# Patient Record
Sex: MALE | Race: WHITE | NOT HISPANIC OR LATINO | Employment: FULL TIME | URBAN - METROPOLITAN AREA
[De-identification: names, ages, dates, MRNs, and addresses within clinical notes are randomized per-mention and may not be internally consistent; named-entity substitution may affect disease eponyms.]

---

## 2017-09-29 ENCOUNTER — OFFICE VISIT - RIVER FALLS (OUTPATIENT)
Dept: FAMILY MEDICINE | Facility: CLINIC | Age: 42
End: 2017-09-29

## 2017-09-29 ASSESSMENT — MIFFLIN-ST. JEOR: SCORE: 1814.32

## 2017-09-30 LAB
CHOLEST SERPL-MCNC: 266 MG/DL
CHOLEST/HDLC SERPL: 4.4 {RATIO}
CREAT SERPL-MCNC: 1.3 MG/DL (ref 0.6–1.35)
GLUCOSE BLD-MCNC: 92 MG/DL (ref 65–99)
HBA1C MFR BLD: 5 %
HDLC SERPL-MCNC: 61 MG/DL
LDLC SERPL CALC-MCNC: 184 MG/DL
NONHDLC SERPL-MCNC: 205 MG/DL
TRIGL SERPL-MCNC: 94 MG/DL

## 2017-10-10 ENCOUNTER — OFFICE VISIT - RIVER FALLS (OUTPATIENT)
Dept: FAMILY MEDICINE | Facility: CLINIC | Age: 42
End: 2017-10-10

## 2017-10-12 ENCOUNTER — OFFICE VISIT - RIVER FALLS (OUTPATIENT)
Dept: FAMILY MEDICINE | Facility: CLINIC | Age: 42
End: 2017-10-12

## 2017-10-12 ASSESSMENT — MIFFLIN-ST. JEOR: SCORE: 1827.02

## 2018-10-02 ENCOUNTER — OFFICE VISIT - RIVER FALLS (OUTPATIENT)
Dept: FAMILY MEDICINE | Facility: CLINIC | Age: 43
End: 2018-10-02

## 2018-10-02 ASSESSMENT — MIFFLIN-ST. JEOR: SCORE: 1818.86

## 2019-04-30 ENCOUNTER — OFFICE VISIT - RIVER FALLS (OUTPATIENT)
Dept: FAMILY MEDICINE | Facility: CLINIC | Age: 44
End: 2019-04-30

## 2019-04-30 ASSESSMENT — MIFFLIN-ST. JEOR: SCORE: 1783.14

## 2019-05-01 ENCOUNTER — COMMUNICATION - RIVER FALLS (OUTPATIENT)
Dept: FAMILY MEDICINE | Facility: CLINIC | Age: 44
End: 2019-05-01

## 2019-05-01 LAB
BUN SERPL-MCNC: 16 MG/DL (ref 7–25)
BUN/CREAT RATIO - HISTORICAL: NORMAL (ref 6–22)
CALCIUM SERPL-MCNC: 9.4 MG/DL (ref 8.6–10.3)
CHLORIDE BLD-SCNC: 102 MMOL/L (ref 98–110)
CHOLEST SERPL-MCNC: 252 MG/DL
CHOLEST/HDLC SERPL: 3.9 {RATIO}
CO2 SERPL-SCNC: 29 MMOL/L (ref 20–32)
CREAT SERPL-MCNC: 1.18 MG/DL (ref 0.6–1.35)
EGFRCR SERPLBLD CKD-EPI 2021: 76 ML/MIN/1.73M2
GLUCOSE BLD-MCNC: 88 MG/DL (ref 65–139)
HDLC SERPL-MCNC: 64 MG/DL
LDLC SERPL CALC-MCNC: 172 MG/DL
NONHDLC SERPL-MCNC: 188 MG/DL
POTASSIUM BLD-SCNC: 4.4 MMOL/L (ref 3.5–5.3)
SODIUM SERPL-SCNC: 137 MMOL/L (ref 135–146)
TRIGL SERPL-MCNC: 65 MG/DL

## 2020-01-03 ENCOUNTER — OFFICE VISIT - RIVER FALLS (OUTPATIENT)
Dept: FAMILY MEDICINE | Facility: CLINIC | Age: 45
End: 2020-01-03

## 2020-01-03 ASSESSMENT — MIFFLIN-ST. JEOR: SCORE: 1833.03

## 2020-02-03 ENCOUNTER — OFFICE VISIT - RIVER FALLS (OUTPATIENT)
Dept: FAMILY MEDICINE | Facility: CLINIC | Age: 45
End: 2020-02-03

## 2020-02-03 ASSESSMENT — MIFFLIN-ST. JEOR: SCORE: 1833.03

## 2020-08-26 ENCOUNTER — OFFICE VISIT - RIVER FALLS (OUTPATIENT)
Dept: FAMILY MEDICINE | Facility: CLINIC | Age: 45
End: 2020-08-26

## 2020-08-26 ASSESSMENT — MIFFLIN-ST. JEOR: SCORE: 1848.46

## 2021-05-03 ENCOUNTER — OFFICE VISIT - RIVER FALLS (OUTPATIENT)
Dept: FAMILY MEDICINE | Facility: CLINIC | Age: 46
End: 2021-05-03

## 2021-05-03 ASSESSMENT — MIFFLIN-ST. JEOR: SCORE: 1860.25

## 2021-10-13 ENCOUNTER — OFFICE VISIT - RIVER FALLS (OUTPATIENT)
Dept: FAMILY MEDICINE | Facility: CLINIC | Age: 46
End: 2021-10-13

## 2021-10-13 ASSESSMENT — MIFFLIN-ST. JEOR: SCORE: 1842.67

## 2021-10-19 ENCOUNTER — AMBULATORY - RIVER FALLS (OUTPATIENT)
Dept: FAMILY MEDICINE | Facility: CLINIC | Age: 46
End: 2021-10-19

## 2021-10-20 ENCOUNTER — COMMUNICATION - RIVER FALLS (OUTPATIENT)
Dept: FAMILY MEDICINE | Facility: CLINIC | Age: 46
End: 2021-10-20

## 2021-10-20 LAB
CHOLEST SERPL-MCNC: 296 MG/DL
CHOLEST/HDLC SERPL: 4.6 {RATIO}
GLUCOSE BLD-MCNC: 93 MG/DL (ref 65–99)
HDLC SERPL-MCNC: 64 MG/DL
LDLC SERPL CALC-MCNC: 203 MG/DL
NONHDLC SERPL-MCNC: 232 MG/DL
TRIGL SERPL-MCNC: 138 MG/DL

## 2022-02-11 VITALS
SYSTOLIC BLOOD PRESSURE: 130 MMHG | HEART RATE: 78 BPM | TEMPERATURE: 97.2 F | BODY MASS INDEX: 29.18 KG/M2 | OXYGEN SATURATION: 96 % | DIASTOLIC BLOOD PRESSURE: 80 MMHG | HEIGHT: 71 IN | WEIGHT: 208.4 LBS

## 2022-02-11 VITALS
RESPIRATION RATE: 16 BRPM | BODY MASS INDEX: 28.7 KG/M2 | SYSTOLIC BLOOD PRESSURE: 130 MMHG | HEIGHT: 71 IN | WEIGHT: 205 LBS | DIASTOLIC BLOOD PRESSURE: 86 MMHG | TEMPERATURE: 96.8 F | HEART RATE: 74 BPM | HEART RATE: 52 BPM | SYSTOLIC BLOOD PRESSURE: 106 MMHG | DIASTOLIC BLOOD PRESSURE: 74 MMHG | HEIGHT: 71 IN | TEMPERATURE: 97.3 F | WEIGHT: 205 LBS | BODY MASS INDEX: 28.7 KG/M2

## 2022-02-11 VITALS
HEART RATE: 70 BPM | SYSTOLIC BLOOD PRESSURE: 132 MMHG | WEIGHT: 201 LBS | HEIGHT: 71 IN | TEMPERATURE: 96.9 F | DIASTOLIC BLOOD PRESSURE: 78 MMHG | BODY MASS INDEX: 28.14 KG/M2

## 2022-02-11 VITALS
HEIGHT: 71 IN | OXYGEN SATURATION: 99 % | DIASTOLIC BLOOD PRESSURE: 86 MMHG | BODY MASS INDEX: 29.12 KG/M2 | TEMPERATURE: 97.9 F | WEIGHT: 208 LBS | SYSTOLIC BLOOD PRESSURE: 134 MMHG | HEART RATE: 75 BPM

## 2022-02-11 VITALS
SYSTOLIC BLOOD PRESSURE: 132 MMHG | BODY MASS INDEX: 27.16 KG/M2 | DIASTOLIC BLOOD PRESSURE: 92 MMHG | OXYGEN SATURATION: 99 % | HEART RATE: 56 BPM | HEIGHT: 71 IN | WEIGHT: 194 LBS | TEMPERATURE: 97.9 F

## 2022-02-11 VITALS
HEIGHT: 71 IN | WEIGHT: 211 LBS | BODY MASS INDEX: 29.54 KG/M2 | HEART RATE: 70 BPM | DIASTOLIC BLOOD PRESSURE: 86 MMHG | SYSTOLIC BLOOD PRESSURE: 134 MMHG | TEMPERATURE: 97.3 F

## 2022-02-12 VITALS
DIASTOLIC BLOOD PRESSURE: 80 MMHG | BODY MASS INDEX: 28.39 KG/M2 | SYSTOLIC BLOOD PRESSURE: 136 MMHG | HEART RATE: 73 BPM | WEIGHT: 202.8 LBS | HEART RATE: 78 BPM | HEIGHT: 71 IN | SYSTOLIC BLOOD PRESSURE: 118 MMHG | WEIGHT: 200 LBS | HEIGHT: 71 IN | BODY MASS INDEX: 28 KG/M2 | TEMPERATURE: 97.2 F | DIASTOLIC BLOOD PRESSURE: 72 MMHG

## 2022-02-16 NOTE — PROGRESS NOTES
Patient:   GAYATHRI ACEVEDO            MRN: [id number]            FIN: [id number]               Age:   44 years     Sex:  Male     :  1975   Associated Diagnoses:   Viral warts   Author:   Melchor Bynum PA-C      Chief Complaint   1/3/2020 11:32 AM CST    Pt here non painful lumps on both elbows x 4 months      History of Present Illness   Chief complaint and symptoms noted above and confirmed with patient       Health Status   Allergies:    Allergic Reactions (All)  No Known Medication Allergies      Histories   Past Medical History:    Resolved  H/O: chickenpox ([id number]):  Resolved.   Family History:    Diabetes  Father     Procedure history:    History of knee surgery ([id number]).  Comments:  1/3/2020 11:37 AM CST - Temo CMA, Gary  meniscus repair      Physical Examination   Vital Signs   1/3/2020 11:32 AM CST Temperature Tympanic 97.3 DegF  LOW    Peripheral Pulse Rate 52 bpm  LOW    Pulse Site Radial artery    Respiratory Rate 16 br/min    Systolic Blood Pressure 130 mmHg    Diastolic Blood Pressure 86 mmHg  HI    Mean Arterial Pressure 101 mmHg    BP Site Right arm      Measurements from flowsheet : Measurements   1/3/2020 11:32 AM CST Height Measured - Standard 70.75 in    Weight Measured - Standard 205 lb    BSA 2.15 m2    Body Mass Index 28.79 kg/m2  HI      General:  No acute distress.    Integumentary:  on right elbow there is a 5 mm wart, on left elbow there is a 1 cm wart.       Review / Management   Course:  warts are  treated with 3 freeze/thaw cycles of liquid nitrogen, sequalae are discussed.       Impression and Plan   Diagnosis     Viral warts (PYX75-KD B07.9).     Summary:  sequelae are discussed, bandage as needed, follow up in 3-4 weeks for retreatment if needed, can also use Mediplast for home treatment.    Orders     Orders   Charges:  05296 unlisted px skin muc membrane +subq tissue (Charge) (Order): Quantity: 1, Viral warts.

## 2022-02-16 NOTE — NURSING NOTE
Depression Screening Entered On:  10/19/2021 1:36 PM CDT    Performed On:  10/13/2021 1:36 PM CDT by Marlin Monzon               Depression Screening   Little Interest - Pleasure in Activities :   Not at all   Feeling Down, Depressed, Hopeless :   Not at all   Initial Depression Screen Score :   0 Score   Poor Appetite or Overeating :   Not at all   Trouble Falling or Staying Asleep :   Several days   Feeling Tired or Little Energy :   Not at all   Feeling Bad About Yourself :   Not at all   Trouble Concentrating :   Not at all   Moving or Speaking Slowly :   Not at all   Thoughts Better Off Dead or Hurting Self :   Not at all   Difficulty at Work, Home, Getting Along :   Not difficult at all   Detailed Depression Screen Score :   1    Total Depression Screen Score :   1    Marlin Monzon - 10/19/2021 1:36 PM CDT

## 2022-02-16 NOTE — NURSING NOTE
CAGE Assessment Entered On:  10/19/2021 1:37 PM CDT    Performed On:  10/13/2021 1:36 PM CDT by Marlin Monzon               Assessment   Have you ever felt you should cut down on your drinking :   No   Have people annoyed you by criticizing your drinking :   No   Have you ever felt bad or guilty about your drinking :   No   Have you ever taken a drink first thing in the morning to steady your nerves or get rid of a hangover (Eye-opener) :   No   CAGE Score :   0    Marlin Monzon - 10/19/2021 1:36 PM CDT

## 2022-02-16 NOTE — PROGRESS NOTES
Patient:   GAYATHRI ACEVEDO            MRN: [id number]            FIN: [id number]               Age:   41 years     Sex:  Male     :  1975   Associated Diagnoses:   None   Author:   Tay Nice MD      Procedure   EKG procedure   Date:  2017.     EKG findings   Interpretation: Tay Nice MD.     Interpretation: normal EKG.

## 2022-02-16 NOTE — PROGRESS NOTES
Chief Complaint    Left leg pain. Was seen in the ED last week for pain and they did a U/S with negative results.  History of Present Illness      Chief complaint as above reviewed and confirmed with patient.  Pt presents to the clinic with concerns re: L leg pain and weakness.  He was seen in the ED 1-20-20 for L leg pain, US negative for DVT. Thought to be due to radiculopathy.  Has treated symptomatically since.  Tried chiropractor x 1, some stretches.  Now worsening with constant pain posterolateral leg, numbness to the lateral foot an weakness (noting he can't raise up to toes on the L.  No bowel or bladder sx.  Difficulty sleeping due to pain.  No hx of cancer.  Very physical at work.  worse with sitting, standing better.  NO saddle paresthesia.  Review of Systems      Review of systems is negative with the exception of those noted in HPI          Physical Exam   Vitals & Measurements    T: 96.8   F (Tympanic)  HR: 74(Peripheral)  BP: 106/74     HT: 70.75 in  WT: 205 lb  BMI: 28.79       Exam of the back reveals no midline tenderness of the T or L spine      Pt able to forward flex: to touch knees      Resuming upright does not increase pain.        Pain with Flexion, no pain with extension, lateral flexion and rotation B.       Muscular strength, sensation and DTR: weakness with PF L foot, not able to do single leg toe raise on the L.  hypoesthesia lateral foot on the L , achilles reflex absent.       SLR positive on the L       Figure 4 negative B        No CVAT       Abdomen: BS active, soft, nontender to light or deep palpation.  no pulsatile masses       Perpheral pulses intact at femoral and DP pulses   Assessment/Plan       Lumbar radiculopathy (M54.16)         MRI given absent reflex and weakness.  prednisone as ordered.  Pt defers additional pain medications for now.  PT referral.  FU with orthopedic spine with results of MRI depending on how he is feeling with oral steroids.          Ordered:           MRI Lumbar Spine (Request), Priority: Soon, Instructions: W/O CONTRAST, Lumbar radiculopathy  Weakness          Physical Therapy (Request), Priority: Soon, Lumbar radiculopathy          Referral (Request), 02/03/20 10:36:00 CST, Referred to: Orthopaedics, Reason for referral: Spine at [facility], Priority: Soon, Lumbar radiculopathy  Weakness                Weakness (R53.1)         Ordered:          MRI Lumbar Spine (Request), Priority: Soon, Instructions: W/O CONTRAST, Lumbar radiculopathy  Weakness          Referral (Request), 02/03/20 10:36:00 CST, Referred to: Orthopaedics, Reason for referral: Spine at [facility], Priority: Soon, Lumbar radiculopathy  Weakness                Orders:         predniSONE, = 2 tab(s) ( 40 mg ), Oral, daily, x 7 day(s), # 14 tab(s), 0 Refill(s), Type: Acute, Pharmacy: [id number], 2 tab(s) Oral daily,x7 day(s), (Ordered)         MR Lumbar* (CDI), Priority: Routine, Instructions: Contrast per Radiologist         Review Orders for Potential Authorizations, 02/03/20 10:37:04 CST  Patient Information     Name:GAYATHRI ACEVEDO      Address:      [address] [place] [id number]     Sex:Male     YOB: 1975     [phone]     Emergency Contact:ANAMIKA ACEVEDO     MRN:[id number]     FIN:[id number]     Location:[facility]     Date of Service:02/03/2020      Primary Care Physician:       NONE ,       Attending Physician:       Christy Muñoz PA-C, [phone]  Problem List/Past Medical History    Ongoing     No qualifying data    Historical     H/O: chickenpox  Procedure/Surgical History     History of knee surgery      Comments: meniscus repair.  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 Spouse/Partner name: Traci. Risks in environment: Does not wear helmet., 10/02/2017     Nutrition/Health      Type of diet: Regular., 10/02/2017     Sexual      Sexually active: Yes. Sexual orientation: Straight or heterosexual., 10/02/2017     Substance Abuse      Never, 10/02/2017     Tobacco      Never (less than 100 in lifetime), 10/02/2017  Family History    Diabetes: Father.    Mother: History is negative    Brother: History is negative    Daughter: History is negative    Son: History is negative  Immunizations      Vaccine Date Status          tetanus/diphth/pertuss (Tdap) adult/adol 04/30/2019 Given          tetanus/diphth/pertuss (Tdap) adult/adol 09/29/2006 Recorded

## 2022-02-16 NOTE — PROGRESS NOTES
Chief Complaint    here for annual exam  History of Present Illness      General health status:  good      Diet:  regular      Exercise:  several days a week      Medical encounters:  none      Dental exam:  current      Eye exam:  due      Caffeine use:  coffee      Tobacco use:  no      Alcohol use:  weekly      Lipid and diabetes screening:  due      Colon cancer screening:  n/a      Prostate cancer screening:  n/a      Other concerns:  warts on legs      Chronic disease:  none         Review of Systems          Constitutional:  No fever, No chills, No sweats, No weakness, No fatigue.            Eye:  No recent visual problem.            Ear/Nose/Mouth/Throat:  No decreased hearing, No nasal congestion, No sore throat.            Respiratory:  No shortness of breath, No cough.            Cardiovascular:  Negative, No chest pain, No palpitations, No peripheral edema.            Gastrointestinal:  No nausea, No vomiting, No diarrhea, No constipation, No heartburn.            Genitourinary:  No dysuria, No change in urine stream.            Hematology/Lymphatics:  No bruising tendency, No bleeding tendency.            Endocrine:  No cold intolerance, No heat intolerance.            Immunologic:  Negative.            Musculoskeletal:  No back pain, No neck pain, No joint pain, No muscle pain.            Integumentary:  rash, No dryness, No skin lesion.            Neurologic:  Alert and oriented X4, No headache.                Psychiatric:  No anxiety, No depression  Physical Exam   Vitals & Measurements    T: 97.9  F (Tympanic)  HR: 75 (Peripheral)  BP: 134/86  SpO2: 99%     HT: 70.5 in  WT: 208 lb  BMI: 29.42           General:  Alert and oriented, No acute distress.            Eye:  Pupils are equal, round and reactive to light, Extraocular movements are intact, Normal conjunctiva.            HENT:  Normocephalic, Tympanic membranes are clear, Oral mucosa is moist, No pharyngeal erythema, No sinus tenderness.             Neck:  Supple, Non-tender, No carotid bruit, No lymphadenopathy, No thyromegaly.            Respiratory:  Lungs are clear to auscultation, Respirations are non-labored, Breath sounds are equal, No chest wall tenderness.            Cardiovascular:  Normal rate, Regular rhythm, No murmur, No gallop, Good pulses equal in all extremities, Normal peripheral perfusion, No edema.            Gastrointestinal:  Soft, Non-tender, Non-distended, Normal bowel sounds, No organomegaly.            Genitourinary:  No CVA tenderness          Lymphatics:  WNL.            Musculoskeletal:  Normal range of motion, Normal strength, No tenderness, No swelling, No deformity.            Integumentary:  Warm, Dry, pink scaly rash on the back          Neurologic:  Alert, Oriented, Normal sensory, Normal motor function, No focal deficits.            Psychiatric:  Cooperative, Appropriate mood & affect.   Assessment/Plan       1. Annual physical exam (Z00.00)        Discussed diet, weight management, BMI, activity and exercise        Follow up in one year        declines SARS-CoV-2 and influenza vaccines         Ordered:          53523 periodic preventive med est patient 40-64yrs (Charge), Quantity: 1, Annual physical exam  Elevated LDL cholesterol level          Return to Clinic (Request), RFV: annual exam, Return in 1 year                2. Elevated LDL cholesterol level (E78.00)         fasting labs ordered         Ordered:          49338 periodic preventive med est patient 40-64yrs (Charge), Quantity: 1, Annual physical exam  Elevated LDL cholesterol level                Orders:         Return to Clinic (Request), RFV: LAB only visit: fasting lipid panel, glucose, Return in 1 week         Return to Clinic (Request), RFV: Yearly exam/physical, Return in 1 year  Patient Information     Name:GAYATHRI ACEVEDO      Address:      [address] [place] [id number]     Sex:Male     YOB: 1975     [phone]      Emergency Contact:TRACI ACEVEDO     MRN:[id number]     FIN:[id number]     Location:[facility]     Date of Service:10/13/2021      Primary Care Physician:       Teja Gonzalez MD, [phone]      Attending Physician:       Teja Gonzalez MD, [phone]  Problem List/Past Medical History    Ongoing     No qualifying data    Historical     H/O: chickenpox  Procedure/Surgical History     History of knee surgery      Comments: meniscus repair.  Medications   No active medications  Allergies    No Known Medication Allergies  Social History    Smoking Status     Never smoker     Alcohol      Current, 3-4 times per week, 2 drinks/episode average.     Electronic Cigarette/Vaping      Electronic Cigarette Use: Never.     Employment/School      Self-Employed     Exercise      Exercise type: Running.     Home/Environment      Marital status: . Spouse/Partner name: Traci. Risks in environment: Does not wear helmet.     Nutrition/Health      Type of diet: Regular.     Sexual      Sexually active: Yes. Sexual orientation: Straight or heterosexual.     Substance Abuse      Never     Tobacco      Never (less than 100 in lifetime)  Family History    CA - Cancer of colon: Father.    Diabetes: Father.    HTN - Hypertension: Father.    Mother: History is negative    Brother: History is negative    Daughter: History is negative    Son: History is negative  Immunizations       Scheduled Immunizations       Dose Date(s)       tetanus/diphth/pertuss (Tdap) adult/adol       09/29/2006       Other Immunizations               tetanus/diphth/pertuss (Tdap) adult/adol       04/30/2019

## 2022-02-16 NOTE — PROGRESS NOTES
Chief Complaint    rash on back, noticed a few months ago.  History of Present Illness      Patient is here with a 2 months history of a rash on his back.      It is non-pruritic, no treatments tried      Feels well  Review of Systems          ROS reviewed and negative except for symptoms noted in HPI.  Physical Exam   Vitals & Measurements    T: 97.3  F (Tympanic)  HR: 70 (Peripheral)  BP: 134/86     HT: 70.75 in  WT: 211 lb  BMI: 29.63           General:  Alert and oriented, No acute distress.             Eye: Normal conjunctiva.             HENT:  Oral mucosa is moist.             Neck:  Supple.             Respiratory:  Respirations are non-labored.             Cardiovascular:  Normal rate           Musculoskeletal:  Normal gait.             Integumentary:                   Head:  nl                 Face:  nl                 Neck:  nl                 UE:  nl                 Trunk:  pink patches with fine scale on back                 LE:  nl           Psychiatric:  Cooperative, Appropriate mood & affect, Normal judgment.   Images     [Image Removed: 2021-05-03 12.07.06]2021-05-03 12.07.06    pink patches with fine scale on back  Assessment/Plan       1. Pityriasis rosea (L42)         observe, topical steroid if needed, follow up if symptoms worsen  Patient Information     Name:GAYATHRI ACEVEDO      Address:      [address] [place] [id number]     Sex:Male     YOB: 1975     [phone]     Emergency Contact:ANAMIKA ACEVEDO     MRN:[id number]     FIN:[id number]     Location:[facility]     Date of Service:05/03/2021      Primary Care Physician:       Teja Gonzalez MD, [phone]      Attending Physician:       Teja Gonzalez MD, [phone]  Problem List/Past Medical History    Ongoing     No qualifying data    Historical     H/O: chickenpox  Procedure/Surgical History     History of knee surgery            Comments: meniscus repair.  Medications   No active  medications  Allergies    No Known Medication Allergies  Social History    Smoking Status     Never smoker     Alcohol      Current, 3-4 times per week, 2 drinks/episode average.     Electronic Cigarette/Vaping      Electronic Cigarette Use: Never.     Employment/School      Self-Employed     Exercise      Exercise type: Running.     Home/Environment      Marital status: . Spouse/Partner name: Traci. Risks in environment: Does not wear helmet.     Nutrition/Health      Type of diet: Regular.     Sexual      Sexually active: Yes. Sexual orientation: Straight or heterosexual.     Substance Abuse      Never     Tobacco      Never (less than 100 in lifetime)  Family History    CA - Cancer of colon: Father.    Diabetes: Father.    HTN - Hypertension: Father.    Mother: History is negative    Brother: History is negative    Daughter: History is negative    Son: History is negative  Immunizations      Vaccine Date Status          tetanus/diphth/pertuss (Tdap) adult/adol 04/30/2019 Given          tetanus/diphth/pertuss (Tdap) adult/adol 09/29/2006 Recorded

## 2022-02-16 NOTE — PROGRESS NOTES
"Chief Complaint    Left shoulder pain, increased pain at night. no known injury. started around July.  History of Present Illness      Patient here for concerns with left shoulder pain that has been there sense around July. Playing basketball hurts when lifting ball up to shoot.      Some tingling down to fingers at times.  Review of Systems      \"See HPI.  All other review of systems negative.\"  Physical Exam   Vitals & Measurements    T: 96.9   F (Tympanic)  HR: 70(Peripheral)  BP: 132/78     HT: 71 in  WT: 201 lb  BMI: 28.03           General:  Alert and oriented, No acute distress.            Eye:  Normal conjunctiva.            HENT:  Oral mucosa is moist.            Neck:  Supple.            Respiratory:  Respirations are non-labored.            Cardiovascular:  Normal rate, No edema.            Gastrointestinal:  Non-distended.            Musculoskeletal:  Normal gait.  Diminished passive ROM of left shoulder. Normal strength, No swelling, No deformity.positive impingement with internal rotation          Integumentary:  Warm, No rash.            Psychiatric:  Cooperative, Appropriate mood & affect, Normal judgment.           General: Alert and oriented, No acute distress.         Assessment/Plan       1. Shoulder impingement (M75.40)         Physical Therapy eval and treat, Analgesics and antipyretics as needed, symptomatic care, and follow up if not improving.               I, Juana Vick LPN, acted solely as a scribe for, and in the presence of Dr. Teja Gonzalez who performed the services.  Patient Information     Name:GAYATHRI ACEVEDO      Address:      [address] [place] [id number]     Sex:Male     YOB: 1975     [phone]     Emergency Contact:ANAMIKA ACEVEDO     MRN:[id number]     FIN:[id number]     Location:[facility]     Date of Service:10/02/2018      Primary Care Physician:       NONE ,       Attending Physician:       Teja Gonzalez MD, [phone]  Problem List/Past Medical History    Ongoing     No qualifying data    Historical     No qualifying data  Medications     No Recorded Medications      Allergies    No Known Medication Allergies  Social History    Smoking Status - 10/02/2018     Never smoker     Alcohol      Current, 3-4 times per week, 2 drinks/episode average., 10/02/2017     Employment and Education      Self-Employed, 10/02/2017     Exercise and Physical Activity      Exercise type: Running., 10/02/2017     Home and Environment      Marital status: . Spouse/Partner name: Traci. Risks in environment: Does not wear helmet., 10/02/2017     Nutrition and Health      Type of diet: Regular., 10/02/2017     Sexual      Sexually active: Yes. Sexual orientation: Straight or heterosexual., 10/02/2017     Substance Abuse      Never, 10/02/2017     Tobacco      Never (less than 100 in lifetime), 10/02/2017  Family History    Diabetes: Father.    Son: History is negative    Daughter: History is negative    Brother: History is negative    Mother: History is negative  Immunizations      Vaccine Date Status      tetanus/diphth/pertuss (Tdap) adult/adol 09/29/2006 Recorded

## 2022-02-16 NOTE — LETTER
(Inserted Image. Unable to display)   August 26, 2021  GAYATHRI ACEVEDO  [address] [place] [address]          Dear GAYATHRI,      Thank you for selecting [facility] for your healthcare needs.    Our records indicate you are due for the following services:     Annual Physical    (FYI   Regarding office visits: In some instances, a video visit or telephone visit may be offered as an option.)        To schedule an appointment or if you have further questions, please contact your clinic at [phone].      Powered by [employer]    Sincerely,    Teja Gonzalez M.D.

## 2022-02-16 NOTE — NURSING NOTE
Comprehensive Intake Entered On:  5/3/2021 11:52 AM CDT    Performed On:  5/3/2021 11:47 AM CDT by Juana Vick LPN               Summary   Chief Complaint :   rash on back, noticed a few months ago.    Weight Measured :   211 lb(Converted to: 211 lb 0 oz, 95.708 kg)    Height Measured :   70.75 in(Converted to: 5 ft 11 in, 179.70 cm)    Body Mass Index :   29.63 kg/m2 (HI)    Body Surface Area :   2.18 m2   Systolic Blood Pressure :   134 mmHg (HI)    Diastolic Blood Pressure :   86 mmHg (HI)    Mean Arterial Pressure :   102 mmHg   Peripheral Pulse Rate :   70 bpm   BP Site :   Right arm   Pulse Site :   Radial artery   BP Method :   Manual   HR Method :   Manual   Temperature Tympanic :   97.3 DegF(Converted to: 36.3 DegC)  (LOW)    Juana Vick LPN - 5/3/2021 11:47 AM CDT   Health Status   Allergies Verified? :   Yes   Medication History Verified? :   Yes   Pre-Visit Planning Status :   Completed   Tobacco Use? :   Never smoker   Juana Vick LPN - 5/3/2021 11:47 AM CDT   Consents   Consent for Immunization Exchange :   Consent Granted   Consent for Immunizations to Providers :   Consent Granted   Juana Vick LPN - 5/3/2021 11:47 AM CDT   Meds / Allergies   (As Of: 5/3/2021 11:52:10 AM CDT)   Allergies (Active)   No Known Medication Allergies  Estimated Onset Date:   Unspecified ; Created By:   Dahlia Walton MA; Reaction Status:   Active ; Category:   Drug ; Substance:   No Known Medication Allergies ; Type:   Allergy ; Updated By:   Dahlia Walton MA; Reviewed Date:   5/3/2021 11:50 AM CDT        Medication List   (As Of: 5/3/2021 11:52:10 AM CDT)        ID Risk Screen   Recent Travel History :   No recent travel   Family Member Travel History :   No recent travel   Other Exposure to Infectious Disease :   Unknown   COVID-19 Testing Status :   No positive COVID-19 test   Juana Vick LPN - 5/3/2021 11:47 AM CDT   Social History   Social History   (As Of: 5/3/2021 11:52:10  AM CDT)   Alcohol:        Current, 3-4 times per week, 2 drinks/episode average.   (Last Updated: 10/2/2017 10:06:52 AM CDT by Marlin Wei)          Tobacco:        Never (less than 100 in lifetime)   (Last Updated: 5/3/2021 11:48:03 AM CDT by Juana Vick LPN)          Electronic Cigarette/Vaping:        Electronic Cigarette Use: Never.   (Last Updated: 5/3/2021 11:48:07 AM CDT by Juana Vick LPN)          Substance Abuse:        Never   (Last Updated: 10/2/2017 10:07:01 AM CDT by Marlin Wei)          Employment/School:        Self-Employed   (Last Updated: 10/2/2017 10:06:28 AM CDT by Marlin Wei)          Home/Environment:        Marital status: .  Spouse/Partner name: Traci.  Risks in environment: Does not wear helmet.   (Last Updated: 10/2/2017 10:06:41 AM CDT by Marlin Wei)          Nutrition/Health:        Type of diet: Regular.   (Last Updated: 10/2/2017 10:07:05 AM CDT by Marlin Wei)          Exercise:        Exercise type: Running.   (Last Updated: 10/2/2017 10:07:10 AM CDT by Marlin Wei)          Sexual:        Sexually active: Yes.  Sexual orientation: Straight or heterosexual.   (Last Updated: 10/2/2017 10:07:15 AM CDT by Marlin Wei)

## 2022-02-16 NOTE — TELEPHONE ENCOUNTER
Patient prefers to receive MRI results first and think about scheduling with Ortho.  Will contact me when ready to proceed with scheduling.

## 2022-02-16 NOTE — NURSING NOTE
Comprehensive Intake Entered On:  4/30/2019 8:36 AM CDT    Performed On:  4/30/2019 8:30 AM CDT by Dahlia Mitchell CMA               Summary   Chief Complaint :   Annual physical exam. Having concerns with high blood pressure   Weight Measured :   194 lb(Converted to: 194 lb 0 oz, 88.00 kg)    Height Measured :   70.75 in(Converted to: 5 ft 11 in, 179.70 cm)    Body Mass Index :   27.25 kg/m2 (HI)    Body Surface Area :   2.09 m2   Systolic Blood Pressure :   132 mmHg (HI)    Diastolic Blood Pressure :   92 mmHg (HI)    Mean Arterial Pressure :   105 mmHg   Peripheral Pulse Rate :   56 bpm (LOW)    BP Site :   Left arm   BP Method :   Manual   Temperature Tympanic :   97.9 DegF(Converted to: 36.6 DegC)    Oxygen Saturation :   99 %   Dahlia Mitchell CMA - 4/30/2019 8:30 AM CDT   Health Status   Allergies Verified? :   Yes   Medication History Verified? :   Yes   Medical History Verified? :   Yes   Pre-Visit Planning Status :   Completed   Tobacco Use? :   Never smoker   Dahlia Mitchell CMA - 4/30/2019 8:30 AM CDT   Consents   Consent for Immunization Exchange :   Consent Granted   Consent for Immunizations to Providers :   Consent Granted   Dahlia Mitchell CMA - 4/30/2019 8:30 AM CDT   Meds / Allergies   (As Of: 4/30/2019 8:36:17 AM CDT)   Allergies (Active)   No Known Medication Allergies  Estimated Onset Date:   Unspecified ; Created By:   Dahlia Walton MA; Reaction Status:   Active ; Category:   Drug ; Substance:   No Known Medication Allergies ; Type:   Allergy ; Updated By:   Dahlia Walton MA; Reviewed Date:   4/30/2019 8:32 AM CDT        Medication List   (As Of: 4/30/2019 8:36:17 AM CDT)   No Known Home Medications     Dahlia Mitcehll CMA - 4/30/2019 8:32:59 AM

## 2022-02-16 NOTE — NURSING NOTE
Depression Screening Entered On:  8/26/2020 12:53 PM CDT    Performed On:  8/26/2020 12:52 PM CDT by Yg GRAHAM, Nikki               Depression Screening   Little Interest - Pleasure in Activities :   Not at all   Feeling Down, Depressed, Hopeless :   Not at all   Initial Depression Screen Score :   0 Score   Poor Appetite or Overeating :   Several days   Trouble Falling or Staying Asleep :   Several days   Feeling Tired or Little Energy :   Several days   Feeling Bad About Yourself :   Not at all   Trouble Concentrating :   Not at all   Moving or Speaking Slowly :   Not at all   Thoughts Better Off Dead or Hurting Self :   Not at all   JAISON Difficulty with Work, Home, Others :   Not difficult at all   Detailed Depression Screen Score :   3    Total Depression Screen Score :   3    Nikki Ronquillo MA - 8/26/2020 12:52 PM CDT

## 2022-02-16 NOTE — LETTER
(Inserted Image. Unable to display)   [address] [place]  [address]  October 20, 2021                      GAYATHRI ACEVEDO      [address] [place] [address]        Dear GAYATHRI,    Thank you for selecting [facility] for your health care needs.  Below you will find the results of your recent test(s) done at our clinic.     Your LDL or bad cholesterol level is quite high.  Given your age the 10 year risk for a heart event in less than 5%. I advise a heart healthy diet and recheck in one year.      Result Name Current Result Reference Range   Cholesterol (mg/dL) ((H)) 296 10/19/2021  - <200   HDL (mg/dL)  64 10/19/2021 > OR = 40 -    Triglyceride (mg/dL)  138 10/19/2021  - <150   LDL ((H)) 203 10/19/2021    Cholesterol/HDL Ratio  4.6 10/19/2021  - <5.0   Non-HDL Cholesterol ((H)) 232 10/19/2021  - <130   Glucose Level (mg/dL)  93 10/19/2021 65 - 99       Please contact me or my assistant at [phone] if you have any questions about your results.    Sincerely,        Samuel Gonzalez MD        What do your labs mean?  Below is a glossary of commonly ordered labs:  LDL   Bad Cholesterol   HDL   Good Cholesterol  AST/ALT   Liver Function   Cr/Creatinine   Kidney Function  Microalbumin   Kidney Function  BUN   Kidney Function  PSA   Prostate    TSH   Thyroid Hormone  HgbA1c   Diabetes Test   Hgb (Hemoglobin)   Red Blood Cells

## 2022-02-16 NOTE — LETTER
(Inserted Image. Unable to display)   May 01, 2019        GAYATHRI ACEVEDO      [address]  [place], [place] [id number]        Dear GAYATHRI,    Thank you for selecting [facility] for your health care needs.  Below you will find the results of your recent test(s) done at our clinic.     Your blood chemistries are in the normal ranges.  LDL or bad cholesterol is a bit elevated.  Diet changes as we discussed at your visit should help reduce this number.  Medication is not indicated at this time.      Result Name Current Result Previous Result Reference Range   Sodium Level (mmol/L)  137 4/30/2019  140 9/29/2017 135 - 146   Potassium Level (mmol/L)  4.4 4/30/2019  5.2 9/29/2017 3.5 - 5.3   Chloride Level (mmol/L)  102 4/30/2019  103 9/29/2017 98 - 110   CO2 Level (mmol/L)  29 4/30/2019  29 9/29/2017 20 - 32   Glucose Level (mg/dL)  88 4/30/2019  92 9/29/2017 65 - 139   BUN (mg/dL)  16 4/30/2019  14 9/29/2017 7 - 25   Creatinine Level (mg/dL)  1.18 4/30/2019  1.30 9/29/2017 0.60 - 1.35   eGFR (mL/min/1.73m2)  76 4/30/2019  68 9/29/2017 > OR = 60 -    Calcium Level (mg/dL)  9.4 4/30/2019  9.7 9/29/2017 8.6 - 10.3   Cholesterol (mg/dL) ((H)) 252 4/30/2019 ((H)) 266 9/29/2017  - <200   Non-HDL Cholesterol ((H)) 188 4/30/2019 ((H)) 205 9/29/2017  - <130   HDL (mg/dL)  64 4/30/2019  61 9/29/2017 >40 -    Cholesterol/HDL Ratio  3.9 4/30/2019  4.4 9/29/2017  - <5.0   LDL ((H)) 172 4/30/2019 ((H)) 184 9/29/2017    Triglyceride (mg/dL)  65 4/30/2019  94 9/29/2017  - <150       Please contact me or my assistant at [phone] if you have any questions about your results.    Sincerely,        Samuel Gonzalez MD        What do your labs mean?  Below is a glossary of commonly ordered labs:  LDL   Bad Cholesterol   HDL   Good Cholesterol  AST/ALT   Liver Function   Cr/Creatinine   Kidney Function  Microalbumin   Kidney Function  BUN   Kidney Function  PSA   Prostate    TSH   Thyroid Hormone  HgbA1c   Diabetes Test   Hgb  (Hemoglobin)   Red Blood Cells

## 2022-02-16 NOTE — NURSING NOTE
Comprehensive Intake Entered On:  2/3/2020 10:05 AM CST    Performed On:  2/3/2020 10:00 AM CST by Dahlia Sotelo               Summary   Chief Complaint :   Left leg pain. Was seen in the ED last week for pain and they did a U/S with negative results.    Weight Measured :   205 lb(Converted to: 205 lb 0 oz, 92.99 kg)    Height Measured :   70.75 in(Converted to: 5 ft 11 in, 179.70 cm)    Body Mass Index :   28.79 kg/m2 (HI)    Body Surface Area :   2.15 m2   Systolic Blood Pressure :   106 mmHg   Diastolic Blood Pressure :   74 mmHg   Mean Arterial Pressure :   85 mmHg   Peripheral Pulse Rate :   74 bpm   Temperature Tympanic :   96.8 DegF(Converted to: 36.0 DegC)  (LOW)    Dahlia Sotelo - 2/3/2020 10:00 AM CST   Health Status   Allergies Verified? :   Yes   Medication History Verified? :   Yes   Medical History Verified? :   Yes   Pre-Visit Planning Status :   Completed   Tobacco Use? :   Never smoker   Dahlia Sotelo - 2/3/2020 10:00 AM CST   Meds / Allergies   (As Of: 2/3/2020 10:05:19 AM CST)   Allergies (Active)   No Known Medication Allergies  Estimated Onset Date:   Unspecified ; Created By:   Dahlia Walton MA; Reaction Status:   Active ; Category:   Drug ; Substance:   No Known Medication Allergies ; Type:   Allergy ; Updated By:   Dahlia Walton MA; Reviewed Date:   2/3/2020 10:05 AM CST        Medication List   (As Of: 2/3/2020 10:05:19 AM CST)

## 2022-02-16 NOTE — NURSING NOTE
Comprehensive Intake Entered On:  10/13/2021 1:06 PM CDT    Performed On:  10/13/2021 1:03 PM CDT by Lawanda Chaves LPN               Summary   Chief Complaint :   here for annual exam   Weight Measured :   208 lb(Converted to: 208 lb 0 oz, 94.347 kg)    Height Measured :   70.5 in(Converted to: 5 ft 10 in, 179.07 cm)    Body Mass Index :   29.42 kg/m2 (HI)    Body Surface Area :   2.16 m2   Systolic Blood Pressure :   134 mmHg (HI)    Diastolic Blood Pressure :   86 mmHg (HI)    Mean Arterial Pressure :   102 mmHg   Peripheral Pulse Rate :   75 bpm   BP Site :   Right arm   BP Method :   Manual   Temperature Tympanic :   97.9 DegF(Converted to: 36.6 DegC)    Oxygen Saturation :   99 %   Lawanda Chaves LPN - 10/13/2021 1:03 PM CDT   Health Status   Allergies Verified? :   Yes   Medication History Verified? :   Yes   Medical History Verified? :   No   Pre-Visit Planning Status :   Completed   Tobacco Use? :   Never smoker   Lawanda Chaves LPN - 10/13/2021 1:03 PM CDT   Meds / Allergies   (As Of: 10/13/2021 1:06:21 PM CDT)   Allergies (Active)   No Known Medication Allergies  Estimated Onset Date:   Unspecified ; Created By:   Dahlia Walton MA; Reaction Status:   Active ; Category:   Drug ; Substance:   No Known Medication Allergies ; Type:   Allergy ; Updated By:   Dahlia Walton MA; Reviewed Date:   5/3/2021 11:50 AM CDT        Medication List   (As Of: 10/13/2021 1:06:21 PM CDT)

## 2022-02-16 NOTE — PROGRESS NOTES
Patient:   GAYATHRI ACEVEDO            MRN: [id number]            FIN: [id number]               Age:   41 years     Sex:  Male     :  1975   Associated Diagnoses:   ALEXA (obstructive sleep apnea); Insomnia   Author:   Diego Ward MD      Chief Complaint   10/12/2017 1:29 PM CDT   f/u Sleep Study      History of Present Illness   Patient presents in follow up after having a polysomnogram on 10/10/17  Reason for test:  _    Results / AHI:  0.3/ hr           Review of Systems   Constitutional:  No fever.    Ear/Nose/Mouth/Throat:  No nasal congestion.    Integumentary:  No rash.              Health Status   Allergies:    Allergic Reactions (Selected)  No Known Medication Allergies   Medications:  (Selected)      Problem list:    No problem items selected or recorded.      Histories   Past Medical History:    No active or resolved past medical history items have been selected or recorded.   Family History:    Diabetes  Father     Procedure history:    No active procedure history items have been selected or recorded.   Social History:        Alcohol Assessment            Current, 3-4 times per week, 2 drinks/episode average.      Tobacco Assessment            Never (less than 100 in lifetime)      Substance Abuse Assessment            Never      Employment and Education Assessment            Self-Employed      Home and Environment Assessment            Marital status: .  Spouse/Partner name: Traci.  Risks in environment: Does not wear helmet.      Nutrition and Health Assessment            Type of diet: Regular.      Exercise and Physical Activity Assessment            Exercise type: Running.      Sexual Assessment            Sexually active: Yes.  Sexual orientation: Straight or heterosexual.        Physical Examination   Vital Signs   10/12/2017 1:29 PM CDT Peripheral Pulse Rate 78 bpm    Systolic Blood Pressure 118 mmHg    Diastolic Blood Pressure 72 mmHg    Mean Arterial Pressure 87 mmHg       Measurements from flowsheet : Measurements   10/12/2017 1:29 PM CDT Height Measured - Standard 71 in    Weight Measured - Standard 202.8 lb    BSA 2.14 m2    Body Mass Index 28.28 kg/m2      General:  Alert and oriented, No acute distress.    Eye:  Pupils are equal, round and reactive to light, Normal conjunctiva.    HENT:  Oral mucosa is moist.    Neck:  Supple.    Respiratory:  Respirations are non-labored.    Cardiovascular:  Normal rate, Regular rhythm, No edema.    Gastrointestinal:  Non-distended.    Musculoskeletal:  Normal gait.    Integumentary:  Warm, No rash.    Psychiatric:  Cooperative, Appropriate mood & affect, Normal judgment.       Impression and Plan   Diagnosis     Insomnia ([id number]-OS G47.00).     Course:  Reviewed patients study and its significance..    Plan:  negative for ALEXA.  no concerns with having surgery  discussed insomnia, sleep restriction and sleep hygiene  Will follow up as needed   , IS[facility]ina [facility], acted solely as a scribe for, and in the presence of Dr. Diego Ward who performed the service..

## 2022-02-16 NOTE — PROGRESS NOTES
Chief Complaint    Preop. DOS 10/17 at [facility] with Dr. Mathis on right knee.  History of Present Illness      Jonn is a 41-year-old gentleman with no significant past medical history who presents for a preop for arthroscopic surgery on the right knee.  He has a history of an injury related meniscal tear there.       He brings up concerns today about his sleep.  He does questions sleep apnea, because his wife has witnessed some apneic episodes.  He feels non-refreshed and unrestored after getting sleep of 6 hours or so.  He does snore quite loudly.  In addition to sleep apnea, though, he brings up concerns about insomnia.  He states that he just is unable to fall asleep.  He begins worrying about a sensation of chest tightness while he is lying down in the more he thinks about it the more tight it feels.  He feels that this is anxiety.       Otherwise, he has never had surgery before.  He denies a family history of anesthesia reactions.  He reports excellent functional status.  He ran a 5K race 2 weeks ago and had no troubles with lightheadedness, chest pain, dyspnea.  Review of Systems      A complete 10 point review of systems was performed and was otherwise.  Physical Exam   Vitals & Measurements    T: 97.2(Tympanic)  HR: 73(Peripheral)  BP: 141/86     HT: 71 in  WT: 200 lb  BMI: 27.89       Overall he appears well.  Repeat manual blood pressure was obtained which was 136/80.  Eyes with normal conjunctiva and pupils equal reactive to light.       Oropharynx is normal.  Mallampati 1.       Neck without any adenopathy.       Heart regular rate and rhythm and no murmurs.       Lungs are clear to auscultation bilaterally       Abdomen soft nontender nondistended.       Skin is without rash       Neurological cranial nerves are grossly intact, and reflexes are normal and gait is normal.       Psych: Calm and cooperative.  Assessment/Plan       High blood pressure         Mildly elevated blood pressure reading.  He  will obtain a blood pressure cuff and check this at home.  We will check a basic metabolic panel today.         Ordered:          Basic Metabolic Panel* ([facility]), Specimen Type: Serum, Collection Date: 09/29/17 9:38:00 CDT          Return to Clinic (Request), RFV: Yearly exam/physical, Return in 1 year                Preop testing         He is cleared for arthroscopic knee surgery without any further testing required.         Ordered:          [id number] ecg routine ecg w/least 12 lds w/i+r (Charge), Quantity: 1, Preop testing          [id number] office outpatient new 30 minutes (Charge), Quantity: 1, Preop testing                Preventative health care         We will check his cholesterol and screen for diabetes today.         Ordered:          Hemoglobin A1c* ([facility]), Specimen Type: Blood, Collection Date: 09/29/17 9:37:00 CDT          Lipid panel with reflex to direct ldl* ([facility]), Specimen Type: Serum, Collection Date: 09/29/17 9:37:00 CDT          Return to Clinic (Request), RFV: Yearly exam/physical, Return in 1 year            We spent quite a bit of time discussing his sleep.  We will test for sleep apnea.  Regarding his insomnia, he will try melatonin every night.  He will try Sleepy time tea.  He will try sleeping separately from his wife because he indicates that she herself was and tends to disturb him somewhat in the night.  We discussed trial of trazodone if these measures are ineffective.  We discussed other aspects of sleep hygiene as well.  Patient Information     Name:GAYATHRI ACEVEDO      Address:      [address] [place] [address]     Sex:Male     YOB: 1975     [phone]     Emergency Contact:ANAMIKA ACEVEDO     MRN:[id number]     FIN:[id number]     Location:[facility]     Date of Service:09/29/2017      Primary Care Physician:       NONE ,   Problem List/Past Medical History    Ongoing     No qualifying data    Historical  Medications   No active  medications  Allergies    No Known Medication Allergies  Social History    Smoking Status - 09/29/2017     Never smoker  Lab Results      Results (Last 90 days)      No results located.         In my opinion, his chest tightness is not due to coronary artery disease. He talks about running a 5k just recently without any difficulty and otherwise has good functional status. His description of a chest tightness that just occurs with lying down would be very atypical for ischemic chest pain.

## 2022-02-16 NOTE — TELEPHONE ENCOUNTER
---------------------  From: Christy Muñoz PA-C   To: Referral Coordinators Pool ([id number]);     Sent: 2/12/2020 8:54:32 AM CST  Subject: General Message     Could you please contact [facility] and assist with appt with [facility] orthopedic spine surgery for L5/S1 lumbar disc herniation with weakness and numbness.   I have communicated MRI results to pt.  Results should be sent to [facility] as well.  Thank You.---------------------  From: Sarah Tyler (Referral Coordinators Pool ([id number]))   To: Christy Muñoz PA-C;     Sent: 2/12/2020 9:11:37 AM CST  Subject: RE: General Message     Yes, will do.

## 2022-02-16 NOTE — NURSING NOTE
CAGE Assessment Entered On:  8/26/2020 12:52 PM CDT    Performed On:  8/26/2020 12:52 PM CDT by Nikki Ronquillo MA               Assessment   Have you ever felt you should cut down on your drinking :   Yes   Have people annoyed you by criticizing your drinking :   No   Have you ever felt bad or guilty about your drinking :   No   Have you ever taken a drink first thing in the morning to steady your nerves or get rid of a hangover (Eye-opener) :   No   CAGE Score :   1    Nikki Ronquillo MA - 8/26/2020 12:52 PM CDT

## 2022-02-16 NOTE — PROGRESS NOTES
Chief Complaint    annual px  History of Present Illness      General health status:  good      Diet:  balanced      Exercise:  occasional      Medical encounters:  JOSE for radicular sx      Dental exam:  up to date      Eye exam:  due      Caffeine use:  energy drinks      Tobacco use:  no      Alcohol use:  weekly      Lipid and diabetes screening:  due      Colon cancer screening:  n/a      Prostate cancer screening:  n/a      Other concerns:  weight gain      Heart disease risk:  1.8% 10 year risk  Review of Systems          Constitutional:  No fever, No chills, No sweats, No weakness, No fatigue.            Eye:  No recent visual problem.            Ear/Nose/Mouth/Throat:  No decreased hearing, No nasal congestion, No sore throat.            Respiratory:  No shortness of breath, No cough.            Cardiovascular:  Negative, No chest pain, No palpitations, No peripheral edema.            Gastrointestinal:  No nausea, No vomiting, No diarrhea, No constipation, No heartburn.            Genitourinary:  No dysuria, No change in urine stream.            Hematology/Lymphatics:  No bruising tendency, No bleeding tendency.            Endocrine:  No cold intolerance, No heat intolerance.            Immunologic:  Negative.            Musculoskeletal:  No back pain, No neck pain, No joint pain, No muscle pain.            Integumentary:  No rash, No dryness, No skin lesion.            Neurologic:  Alert and oriented X4, No headache.                Psychiatric:  No anxiety, No depression  Physical Exam   Vitals & Measurements    T: 97.2  F (Tympanic)  HR: 78 (Peripheral)  BP: 130/80  SpO2: 96%     HT: 70.75 in  WT: 208.4 lb  BMI: 29.27           General:  Alert and oriented, No acute distress.            Eye:  Pupils are equal, round and reactive to light, Extraocular movements are intact, Normal conjunctiva.            HENT:  Normocephalic, Tympanic membranes are clear, Oral mucosa is moist, No pharyngeal erythema, No  sinus tenderness.            Neck:  Supple, Non-tender, No carotid bruit, No lymphadenopathy, No thyromegaly.            Respiratory:  Lungs are clear to auscultation, Respirations are non-labored, Breath sounds are equal, No chest wall tenderness.            Cardiovascular:  Normal rate, Regular rhythm, No murmur, No gallop, Good pulses equal in all extremities, Normal peripheral perfusion, No edema.            Gastrointestinal:  Soft, Non-tender, Non-distended, Normal bowel sounds, No organomegaly.              Genitourinary:  No CVA tenderness          Lymphatics:  WNL.            Musculoskeletal:  Normal range of motion, Normal strength, No tenderness, No swelling, No deformity.            Integumentary:  Warm, Dry, No rash.            Neurologic:  Alert, Oriented, Normal sensory, Normal motor function, No focal deficits.            Psychiatric:  Cooperative, Appropriate mood & affect.   Assessment/Plan       1. Annual physical exam (Z00.00)         Ordered:          Return to Clinic (Request), RFV: Yearly exam/physical, Return in 1 year               Activity recommendations:  150-300 minutes of moderate physical activity per week; moderate or greater intensity muscle strengthening activity 2 or more days a week      Diet recommendations:  Eating plan to promote a caloric deficit of 500-750 kcal per day.  Mediterranean or DASH diet offer well balanced food choices.  Patient Information     Name:GAYATHRI ACEVEDO      Address:      [address] [place] [phone]     Sex:Male     YOB: 1975     [phone]     Emergency Contact:ANAMIKA ACEVEDO     MRN:[id number]     FIN:[id number]     Location:[facility]     Date of Service:08/26/2020      Primary Care Physician:       Teja Gonzalez MD, [phone]      Attending Physician:       Teja Gonzalez MD, [phone]  Problem List/Past Medical History    Ongoing     No qualifying data    Historical     H/O:  chickenpox  Procedure/Surgical History     History of knee surgery      Comments: meniscus repair.  Medications   No active medications  Allergies    No Known Medication Allergies  Social History    Smoking Status     Never smoker     Alcohol      Current, 3-4 times per week, 2 drinks/episode average.     Employment/School      Self-Employed     Exercise      Exercise type: Running.     Home/Environment      Marital status: . Spouse/Partner name: Traci. Risks in environment: Does not wear helmet.     Nutrition/Health      Type of diet: Regular.     Sexual      Sexually active: Yes. Sexual orientation: Straight or heterosexual.     Substance Abuse      Never     Tobacco      Never (less than 100 in lifetime)  Family History    Diabetes: Father.    Mother: History is negative    Brother: History is negative    Daughter: History is negative    Son: History is negative  Immunizations      Vaccine Date Status          tetanus/diphth/pertuss (Tdap) adult/adol 04/30/2019 Given          tetanus/diphth/pertuss (Tdap) adult/adol 09/29/2006 Recorded

## 2022-02-16 NOTE — TELEPHONE ENCOUNTER
Patient Information     Name:GAYATHRI ACEVEDO      Address:      [address], [place] [id number]     Sex:Male     YOB: 1975     [phone]     Emergency Contact:ANAMIKA ACEVEDO     MRN:[id number]     FIN:[id number]     Location:[facility]     Date of Service:05/01/2019      Primary Care Physician:       NONE ,    Discussed results of lumbar spine MRI with pt over the telephone. findings consistent with his sx.  Pt is doing well with PT, pain is well controlled but he continues to have weakness LLE and numbness in the foot.  He will follow up with [facility] spine surgery for further evaluation and management options.

## 2022-02-16 NOTE — RESULTS
-------------------------------- Original Report -------------------------------    EXAM:  MR LUMBAR SPINE WITHOUT CONTRAST 3T    CLINICAL INFORMATION:  Left buttock and left leg pain with weakness and  numbness. No injury.    TECHNICAL INFORMATION:  T1, T2 and STIR sagittal sections through the lumbar  spine with T2 coronal sections and T1/T2 axial sections at selected levels.           COMPARISON IMAGES:  No comparisons.     INTERPRETATION:  Segmentation and Alignment: Lordotic alignment of five  lumbar-type vertebrae.      Sacrum and Sacroiliac joints: Normal sacrum and sacroiliac joints.     L5-S1: Moderate disc degeneration with a moderate-sized 6-7 mm broad-based  caudally extruded high signal intensity left posterolateral disc herniation  impinging on the traversing left S1 nerve root seen best on sagittal image 10  and axial image 6. Mild foraminal stenosis on the left. Facet joints normal.    L4-5: Spondylosis with mild right posterolateral bulging, normal facet joints  and no stenosis or impingement.    L3-4, L2-3 and L1-2: Normal intervertebral disc and facet joints. No stenosis or  impingement.    T12-L1: Mild disc degeneration with mild endplate irregularities, normal facet  joints and no stenosis or impingement.     Conus: Normal signal intensity within the conus medullaris.  No intradural mass  or arachnoidal adhesions.     Osseous and paraspinous structures: Normal signal intensity within the vertebral  marrow spaces.  No fracture or avulsion  No destructive bone lesion and no  paraspinous mass.         CONCLUSION:       1.  Moderate-sized 6-7 mm broad-based caudally extruded left posterolateral disc  herniation at L5-S1 with left S1 nerve root impingement.  2. Moderate L5-S1 disc degeneration with mild foraminal stenosis on the left.  3. Mild degenerative changes at L4-5 and T12-L1.      Read by: Jose Daniel Thorpe M.D.  Reviewed and Electronically Signed by: Jose Daniel Thorpe M.D.

## 2022-02-16 NOTE — NURSING NOTE
Comprehensive Intake Entered On:  8/26/2020 9:06 AM CDT    Performed On:  8/26/2020 9:01 AM CDT by Nikki Ronquillo MA               Summary   Chief Complaint :   annual px   Weight Measured :   208.4 lb(Converted to: 208 lb 6 oz, 94.53 kg)    Height Measured :   70.75 in(Converted to: 5 ft 11 in, 179.70 cm)    Body Mass Index :   29.27 kg/m2 (HI)    Body Surface Area :   2.17 m2   Systolic Blood Pressure :   130 mmHg   Diastolic Blood Pressure :   80 mmHg   Mean Arterial Pressure :   97 mmHg   Peripheral Pulse Rate :   78 bpm   BP Site :   Right arm   Pulse Site :   Radial artery   BP Method :   Manual   HR Method :   Manual   Temperature Tympanic :   97.2 DegF(Converted to: 36.2 DegC)  (LOW)    Oxygen Saturation :   96 %   Nikki Ronquillo MA - 8/26/2020 9:01 AM CDT   Health Status   Allergies Verified? :   Yes   Medication History Verified? :   Yes   Medical History Verified? :   Yes   Pre-Visit Planning Status :   Completed   Tobacco Use? :   Never smoker   Nikki Ronquillo MA 8/26/2020 9:01 AM CDT   Consents   Consent for Immunization Exchange :   Consent Granted   Consent for Immunizations to Providers :   Consent Granted   Nikki Ronquillo MA 8/26/2020 9:01 AM CDT   Meds / Allergies   (As Of: 8/26/2020 9:06:56 AM CDT)   Allergies (Active)   No Known Medication Allergies  Estimated Onset Date:   Unspecified ; Created By:   Dahlia Walton MA; Reaction Status:   Active ; Category:   Drug ; Substance:   No Known Medication Allergies ; Type:   Allergy ; Updated By:   Dahlia Walton MA; Reviewed Date:   2/3/2020 10:05 AM CST        Medication List   (As Of: 8/26/2020 9:06:56 AM CDT)   No Known Home Medications     Nikki Ronquillo MA - 8/25/2020 5:24:00 PM           ID Risk Screen   Recent Travel History :   No recent travel   Family Member Travel History :   No recent travel   Other Exposure to Infectious Disease :   Unknown   Nikki Ronquillo MA - 8/26/2020 9:01 AM CDT   Social History   Social History    (As Of: 8/26/2020 9:06:56 AM CDT)   Alcohol:        Current, 3-4 times per week, 2 drinks/episode average.   (Last Updated: 10/2/2017 10:06:52 AM CDT by Marlin Wei)          Tobacco:        Never (less than 100 in lifetime)   (Last Updated: 10/2/2017 10:06:57 AM CDT by Marlin Wei)          Substance Abuse:        Never   (Last Updated: 10/2/2017 10:07:01 AM CDT by Marlin Wei)          Employment/School:        Self-Employed   (Last Updated: 10/2/2017 10:06:28 AM CDT by Marlin Wei)          Home/Environment:        Marital status: .  Spouse/Partner name: Traci.  Risks in environment: Does not wear helmet.   (Last Updated: 10/2/2017 10:06:41 AM CDT by Marlin Wei)          Nutrition/Health:        Type of diet: Regular.   (Last Updated: 10/2/2017 10:07:05 AM CDT by Marlin Wei)          Exercise:        Exercise type: Running.   (Last Updated: 10/2/2017 10:07:10 AM CDT by Marlin Wei)          Sexual:        Sexually active: Yes.  Sexual orientation: Straight or heterosexual.   (Last Updated: 10/2/2017 10:07:15 AM CDT by Marlin Wei)

## 2022-02-16 NOTE — PROGRESS NOTES
"Chief Complaint    Annual physical exam. Having concerns with high blood pressure  History of Present Illness      Weight one year ago 202 lbs.  Today s weight 194 lbs.      Last Dental Exam:  UTD      Last Eye Exam: Due      Immunizations:  Gwen      Does have a home blood pressure machine at home, monitor blood pressure.  Both parents have high blood pressure. Goal is less then 130/80. Has tired to become more active, goes to the [facility] 3-4 times a week. Not eating a heart healthy diet, a lot of box and canned foods. Does feel left foot discomfort at times.  Review of Systems      \"See HPI.  All other review of systems negative.\"  Physical Exam   Vitals & Measurements    T: 97.9   F (Tympanic)  HR: 56(Peripheral)  BP: 132/92  SpO2: 99%     HT: 70.75 in  WT: 194 lb  BMI: 27.25       General: Alert and oriented, No acute distress.      Eye: Pupils are equal, round and reactive to light, Extraocular movements are intact, Normal conjunctiva.      HENT: Normocephalic, Tympanic membranes are clear, Oral mucosa is moist, No pharyngeal erythema, No sinus tenderness.      Neck: Supple, Non-tender, No carotid bruit, No lymphadenopathy, No thyromegaly.      Respiratory: Lungs are clear to auscultation, Respirations are non-labored, Breath sounds are equal, No chest wall tenderness.      Cardiovascular: Normal rate, Regular rhythm, No murmur, No gallop, Good pulses equal in all extremities, Normal peripheral perfusion, No edema.      Gastrointestinal: Soft, Non-tender, Non-distended, Normal bowel sounds, No organomegaly.      Genitourinary: No costovertebral angle tenderness.      Lymphatics: WNL.      Musculoskeletal: Normal range of motion, Normal strength, No tenderness, No swelling, No deformity.      Integumentary: Warm, Dry, No rash.      Neurologic: Alert, Oriented, Normal sensory, Normal motor function, No focal deficits.      Psychiatric: Cooperative, Appropriate mood & affect.         Assessment/Plan       1. Annual " physical exam (Z00.00)         Continue with exercise routine, DASH diet printed. Advised 10 lb weight loss. Fasting labs done, patient will be notified when results are available.        Continue to monitor BP, educated on how to take BP, goal is <130/80                Immunization due (Z23)         Adacel given.         Ordered:          tetanus/diphth/pertuss (Tdap) adult/adol, 0.5 mL, im, once, (Completed)          55020 imadm prq id subq/im njxs 1 vaccine (Charge), Quantity: 1, Immunization due          53839 tdap vaccine 7/> yr im (Charge), Quantity: 1, Immunization due                Orders:         Basic Metabolic Panel* ([facility]), Specimen Type: Serum, Collection Date: 04/30/19 8:49:00 CDT         Lipid panel with reflex to direct ldl* ([facility]), Specimen Type: Serum, Collection Date: 04/30/19 8:49:00 CDT         Return to Clinic (Request), RFV: Yearly exam/physical, Return in 1 year      IJuana LPN, acted solely as a scribe for, and in the presence of Dr. Teja Gonzalez who performed the services.  Patient Information     Name:GAYATHRI ACEVEDO      Address:      [address] [place] [id number]     Sex:Male     YOB: 1975     [phone]     Emergency Contact:ANAMIKA ACEVEDO     MRN:[id number]     FIN:[id number]     Location[facility]     Date of Service:04/30/2019      Primary Care Physician:       NONE ,       Attending Physician:       Teja Gonzalez MD, [phone]  Problem List/Past Medical History    Ongoing     No qualifying data    Historical     No qualifying data  Medications     No medications documented  Allergies    No Known Medication Allergies  Social History    Smoking Status - 04/30/2019     Never smoker     Alcohol      Current, 3-4 times per week, 2 drinks/episode average., 10/02/2017     Employment and Education      Self-Employed, 10/02/2017     Exercise and Physical Activity      Exercise type: Running., 10/02/2017     Home  and Environment      Marital status: . Spouse/Partner name: Traci. Risks in environment: Does not wear helmet., 10/02/2017     Nutrition and Health      Type of diet: Regular., 10/02/2017     Sexual      Sexually active: Yes. Sexual orientation: Straight or heterosexual., 10/02/2017     Substance Abuse      Never, 10/02/2017     Tobacco      Never (less than 100 in lifetime), 10/02/2017  Family History    Diabetes: Father.    Mother: History is negative    Brother: History is negative    Daughter: History is negative    Son: History is negative  Immunizations      Vaccine Date Status      tetanus/diphth/pertuss (Tdap) adult/adol 04/30/2019 Given      tetanus/diphth/pertuss (Tdap) adult/adol 09/29/2006 Recorded

## 2022-02-16 NOTE — NURSING NOTE
Comprehensive Intake Entered On:  1/3/2020 11:39 AM CST    Performed On:  1/3/2020 11:32 AM CST by Gary Plascencia CMA               Summary   Chief Complaint :   Pt here non painful lumps on both elbows x 4 months   Weight Measured :   205 lb(Converted to: 205 lb 0 oz, 92.99 kg)    Height Measured :   70.75 in(Converted to: 5 ft 11 in, 179.70 cm)    Body Mass Index :   28.79 kg/m2 (HI)    Body Surface Area :   2.15 m2   Systolic Blood Pressure :   130 mmHg   Diastolic Blood Pressure :   86 mmHg (HI)    Mean Arterial Pressure :   101 mmHg   Peripheral Pulse Rate :   52 bpm (LOW)    BP Site :   Right arm   Pulse Site :   Radial artery   Temperature Tympanic :   97.3 DegF(Converted to: 36.3 DegC)  (LOW)    Respiratory Rate :   16 br/min   Gary Plascencia CMA - 1/3/2020 11:32 AM CST   Health Status   Allergies Verified? :   Yes   Medication History Verified? :   Yes   Medical History Verified? :   Yes   Pre-Visit Planning Status :   Not completed   Tobacco Use? :   Never smoker   Gary Plascencia CMA - 1/3/2020 11:32 AM CST   Meds / Allergies   (As Of: 1/3/2020 11:39:02 AM CST)   Allergies (Active)   No Known Medication Allergies  Estimated Onset Date:   Unspecified ; Created By:   Dahlia Walton MA; Reaction Status:   Active ; Category:   Drug ; Substance:   No Known Medication Allergies ; Type:   Allergy ; Updated By:   Dahlia Walton MA; Reviewed Date:   1/3/2020 11:36 AM CST        Medication List   (As Of: 1/3/2020 11:39:02 AM CST)        Procedures / Surgeries        -    Procedure History   (As Of: 1/3/2020 11:39:02 AM CST)     Anesthesia Minutes:   0 ; Procedure Name:   History of knee surgery ; Procedure Minutes:   0 ; Comments:     1/3/2020 11:37 AM CST - Gary Plascencia CMA  meniscus repair ; Last Reviewed Dt/Tm:   1/3/2020 11:37:13 AM CST            Social History   Social History   (As Of: 1/3/2020 11:39:02 AM CST)   Alcohol:        Current, 3-4 times per week, 2 drinks/episode average.   (Last Updated:  10/2/2017 10:06:52 AM CDT by Marlin Wei)          Tobacco:        Never (less than 100 in lifetime)   (Last Updated: 10/2/2017 10:06:57 AM CDT by Marlin Wei)          Substance Abuse:        Never   (Last Updated: 10/2/2017 10:07:01 AM CDT by Marlin Wei)          Employment/School:        Self-Employed   (Last Updated: 10/2/2017 10:06:28 AM CDT by Marlin Wei)          Home/Environment:        Marital status: .  Spouse/Partner name: Traci.  Risks in environment: Does not wear helmet.   (Last Updated: 10/2/2017 10:06:41 AM CDT by Marlin Wei)          Nutrition/Health:        Type of diet: Regular.   (Last Updated: 10/2/2017 10:07:05 AM CDT by Marlin Wei)          Exercise:        Exercise type: Running.   (Last Updated: 10/2/2017 10:07:10 AM CDT by Marlin Wei)          Sexual:        Sexually active: Yes.  Sexual orientation: Straight or heterosexual.   (Last Updated: 10/2/2017 10:07:15 AM CDT by Marlin Wei)   15

## 2023-07-13 ENCOUNTER — OFFICE VISIT (OUTPATIENT)
Dept: FAMILY MEDICINE | Facility: CLINIC | Age: 48
End: 2023-07-13
Payer: COMMERCIAL

## 2023-07-13 VITALS
BODY MASS INDEX: 29.31 KG/M2 | TEMPERATURE: 96.8 F | SYSTOLIC BLOOD PRESSURE: 150 MMHG | DIASTOLIC BLOOD PRESSURE: 97 MMHG | RESPIRATION RATE: 20 BRPM | HEIGHT: 71 IN | HEART RATE: 69 BPM | OXYGEN SATURATION: 99 % | WEIGHT: 209.4 LBS

## 2023-07-13 DIAGNOSIS — B36.0 TINEA VERSICOLOR: Primary | ICD-10-CM

## 2023-07-13 DIAGNOSIS — Z12.11 SCREEN FOR COLON CANCER: ICD-10-CM

## 2023-07-13 PROCEDURE — 99213 OFFICE O/P EST LOW 20 MIN: CPT | Performed by: FAMILY MEDICINE

## 2023-07-13 RX ORDER — FLUCONAZOLE 150 MG/1
300 TABLET ORAL WEEKLY
Qty: 4 TABLET | Refills: 0 | Status: SHIPPED | OUTPATIENT
Start: 2023-07-13 | End: 2024-02-20

## 2023-07-13 ASSESSMENT — PATIENT HEALTH QUESTIONNAIRE - PHQ9
10. IF YOU CHECKED OFF ANY PROBLEMS, HOW DIFFICULT HAVE THESE PROBLEMS MADE IT FOR YOU TO DO YOUR WORK, TAKE CARE OF THINGS AT HOME, OR GET ALONG WITH OTHER PEOPLE: NOT DIFFICULT AT ALL
SUM OF ALL RESPONSES TO PHQ QUESTIONS 1-9: 1
SUM OF ALL RESPONSES TO PHQ QUESTIONS 1-9: 1

## 2023-07-13 NOTE — PROGRESS NOTES
"  Assessment & Plan     Screen for colon cancer  Patient has family history of colon cancer.  Colonoscopy has been ordered  - Colonoscopy Screening  Referral    Tinea versicolor  Diffuse erythematous rash on the back and through this degree on the chest.  He has had it for some time.  Treat with oral fluconazole and follow-up if there is no resolution.  - fluconazole (DIFLUCAN) 150 MG tablet; Take 2 tablets (300 mg) by mouth once a week             BMI:   Estimated body mass index is 29.62 kg/m  as calculated from the following:    Height as of this encounter: 1.791 m (5' 10.5\").    Weight as of this encounter: 95 kg (209 lb 6.4 oz).           Teja Gonzalez MD  [facility]    Fabricio Bailey is a 47 year old, presenting for the following health issues:  Derm Problem (C/o red spots on back)        7/13/2023    10:23 AM   Additional Questions   Roomed by Nikik SCHREIBER CMA   Accompanied by Self     History of Present Illness       Reason for visit:  Spots on back    He eats 0-1 servings of fruits and vegetables daily.He consumes 1 sweetened beverage(s) daily.He exercises with enough effort to increase his heart rate 30 to 60 minutes per day.  He exercises with enough effort to increase his heart rate 3 or less days per week.   He is taking medications regularly.    Today's PHQ-9         PHQ-9 Total Score: 1    PHQ-9 Q9 Thoughts of better off dead/self-harm past 2 weeks :   Not at all    How difficult have these problems made it for you to do your work, take care of things at home, or get along with other people: Not difficult at all     Patient is here with a diffuse rash on the trunk.  We have looked at this in the past.  It seems to be spreading.  It is occasionally pruritic.  It becomes a bit brighter red when he is warm.          Review of Systems   Constitutional, HEENT, cardiovascular, pulmonary, gi and gu systems are negative, except as otherwise noted.      Objective    BP " "(!) 150/97 (BP Location: Right arm, Patient Position: Sitting, Cuff Size: Adult Large)   Pulse 69   Temp 96.8  F (36  C) (Tympanic)   Resp 20   Ht 1.791 m (5' 10.5\")   Wt 95 kg (209 lb 6.4 oz)   SpO2 99%   BMI 29.62 kg/m    Body mass index is 29.62 kg/m .  Physical Exam   Alert, oriented, no acute distress  Normal heart rate  Nonlabored breathing  Skin exam shows an erythematous rash on the back and chest.  It does not debra.                    "

## 2023-08-13 ENCOUNTER — HEALTH MAINTENANCE LETTER (OUTPATIENT)
Age: 48
End: 2023-08-13

## 2023-10-02 ENCOUNTER — TRANSFERRED RECORDS (OUTPATIENT)
Dept: HEALTH INFORMATION MANAGEMENT | Facility: CLINIC | Age: 48
End: 2023-10-02
Payer: COMMERCIAL

## 2023-10-17 ENCOUNTER — TRANSFERRED RECORDS (OUTPATIENT)
Dept: HEALTH INFORMATION MANAGEMENT | Facility: CLINIC | Age: 48
End: 2023-10-17
Payer: COMMERCIAL

## 2024-02-20 ENCOUNTER — NURSE TRIAGE (OUTPATIENT)
Dept: NURSING | Facility: CLINIC | Age: 49
End: 2024-02-20

## 2024-02-20 ENCOUNTER — OFFICE VISIT (OUTPATIENT)
Dept: FAMILY MEDICINE | Facility: CLINIC | Age: 49
End: 2024-02-20
Payer: COMMERCIAL

## 2024-02-20 VITALS
WEIGHT: 214.1 LBS | DIASTOLIC BLOOD PRESSURE: 120 MMHG | TEMPERATURE: 98.4 F | BODY MASS INDEX: 29.97 KG/M2 | HEART RATE: 70 BPM | SYSTOLIC BLOOD PRESSURE: 182 MMHG | HEIGHT: 71 IN | RESPIRATION RATE: 20 BRPM | OXYGEN SATURATION: 99 %

## 2024-02-20 DIAGNOSIS — I10 ESSENTIAL HYPERTENSION: ICD-10-CM

## 2024-02-20 DIAGNOSIS — Z12.11 SCREEN FOR COLON CANCER: Primary | ICD-10-CM

## 2024-02-20 DIAGNOSIS — Z11.4 SCREENING FOR HIV (HUMAN IMMUNODEFICIENCY VIRUS): ICD-10-CM

## 2024-02-20 DIAGNOSIS — Z11.59 NEED FOR HEPATITIS C SCREENING TEST: ICD-10-CM

## 2024-02-20 LAB
ANION GAP SERPL CALCULATED.3IONS-SCNC: 11 MMOL/L (ref 7–15)
BUN SERPL-MCNC: 15.6 MG/DL (ref 6–20)
CALCIUM SERPL-MCNC: 9.5 MG/DL (ref 8.6–10)
CHLORIDE SERPL-SCNC: 102 MMOL/L (ref 98–107)
CHOLEST SERPL-MCNC: 329 MG/DL
CREAT SERPL-MCNC: 1.04 MG/DL (ref 0.67–1.17)
DEPRECATED HCO3 PLAS-SCNC: 26 MMOL/L (ref 22–29)
EGFRCR SERPLBLD CKD-EPI 2021: 89 ML/MIN/1.73M2
FASTING STATUS PATIENT QL REPORTED: ABNORMAL
GLUCOSE SERPL-MCNC: 101 MG/DL (ref 70–99)
HCV AB SERPL QL IA: NONREACTIVE
HDLC SERPL-MCNC: 61 MG/DL
HIV 1+2 AB+HIV1 P24 AG SERPL QL IA: NONREACTIVE
LDLC SERPL CALC-MCNC: 230 MG/DL
NONHDLC SERPL-MCNC: 268 MG/DL
POTASSIUM SERPL-SCNC: 4.6 MMOL/L (ref 3.4–5.3)
SODIUM SERPL-SCNC: 139 MMOL/L (ref 135–145)
TRIGL SERPL-MCNC: 189 MG/DL

## 2024-02-20 PROCEDURE — 36415 COLL VENOUS BLD VENIPUNCTURE: CPT | Performed by: PHYSICIAN ASSISTANT

## 2024-02-20 PROCEDURE — 99214 OFFICE O/P EST MOD 30 MIN: CPT | Mod: 25 | Performed by: PHYSICIAN ASSISTANT

## 2024-02-20 PROCEDURE — 93000 ELECTROCARDIOGRAM COMPLETE: CPT | Performed by: PHYSICIAN ASSISTANT

## 2024-02-20 PROCEDURE — 86803 HEPATITIS C AB TEST: CPT | Performed by: PHYSICIAN ASSISTANT

## 2024-02-20 PROCEDURE — 80061 LIPID PANEL: CPT | Performed by: PHYSICIAN ASSISTANT

## 2024-02-20 PROCEDURE — 87389 HIV-1 AG W/HIV-1&-2 AB AG IA: CPT | Performed by: PHYSICIAN ASSISTANT

## 2024-02-20 PROCEDURE — 80048 BASIC METABOLIC PNL TOTAL CA: CPT | Performed by: PHYSICIAN ASSISTANT

## 2024-02-20 RX ORDER — LISINOPRIL 20 MG/1
20 TABLET ORAL DAILY
Qty: 90 TABLET | Refills: 0 | Status: SHIPPED | OUTPATIENT
Start: 2024-02-20 | End: 2024-09-24

## 2024-02-20 NOTE — PROGRESS NOTES
"  Assessment & Plan     (Z12.11) Screen for colon cancer  (primary encounter diagnosis)  Comment: Colonoscopy family history of colon cancer in father  Plan: Colonoscopy Screening  Referral            (Z11.4) Screening for HIV (human immunodeficiency virus)  Comment: Consents to screening  Plan: HIV Antigen Antibody Combo Cascade            (Z11.59) Need for hepatitis C screening test  Comment: Consents to screen  Plan: Hepatitis C Screen Reflex to HCV RNA Quant and         Genotype, HIV Antigen Antibody Combo Cascade            (I10) Essential hypertension  Comment: Start lisinopril 20 mg once daily dosing and side effects explained including chronic cough and angioedema  Plan: Lipid panel reflex to direct LDL Non-fasting,         Basic metabolic panel, EKG 12-lead complete         w/read - Clinics, lisinopril (ZESTRIL) 20 MG         tablet                    BMI  Estimated body mass index is 30.29 kg/m  as calculated from the following:    Height as of this encounter: 1.791 m (5' 10.5\").    Weight as of this encounter: 97.1 kg (214 lb 1.6 oz).         EKG is normal      Subjective   Jonn is a 48 year old, presenting for the following health issues:  Hypertension (Blood pressure concerns /)        2/20/2024     9:40 AM   Additional Questions   Roomed by GLADYS Whitten     48-year-old presents to the clinic to discuss his blood pressure.  He has noticed that it has been elevated off and on the last few months.  In October when he was in for his last visit it was mildly elevated  He has been getting more significant elevations at home his blood pressure today was 182/120 and he states that is about what it has been running occasionally he will get a resting comfort in the chest thing with exertion no diaphoresis no nausea it is very brief in duration and random  Father had hypertension  He is not a tobacco user  He said no headaches no neck or facial fullness or flushing no visual change    History of Present " "Illness       Hypertension: He presents for follow up of hypertension.  He does check blood pressure  regularly outside of the clinic. Outside blood pressures have been over 140/90. He does not follow a low salt diet.     He eats 0-1 servings of fruits and vegetables daily.He consumes 1 sweetened beverage(s) daily.He exercises with enough effort to increase his heart rate 30 to 60 minutes per day.  He exercises with enough effort to increase his heart rate 5 days per week.   He is taking medications regularly.                     Objective    BP (!) 182/120 (BP Location: Right arm, Patient Position: Sitting, Cuff Size: Adult Large)   Pulse 70   Temp 98.4  F (36.9  C) (Oral)   Resp 20   Ht 1.791 m (5' 10.5\")   Wt 97.1 kg (214 lb 1.6 oz)   SpO2 99%   BMI 30.29 kg/m    Body mass index is 30.29 kg/m .  Physical Exam attentive no acute distress  Ears canals and drums normal  Conjunctiva pink eyes are PERRL extraocular muscles are intact fundi grossly normal to undilated funduscopic exam  Oropharynx benign  Neck supple no adenopathy no carotid bruits noted  Nasal mucosa is clear  Lungs clear ventilated  Cardiovascular normal rate  and rhythm  Abdomen soft nontender no bruits noted              Signed Electronically by: NHI Sullivan    "

## 2024-02-20 NOTE — TELEPHONE ENCOUNTER
"Nurse Triage SBAR    Is this a 2nd Level Triage? NO    Situation:   Patient reporting elevated blood pressure readings on home monitor.    Background:   See last office visit 7/13/23 blood pressure 150/97. Patient is not on any blood pressure medication at this time.    Assessment:     Patient stating he checks his blood pressure daily at home and usually ranges 160's/? Unknown diastolic. Stating he has not been watching the lower number.  Today he is getting 186/116 in past 30 minutes, rechecked 163/?.  During triage 186/116 pulse 76.  Patient denies any current symptoms including, chest pain, headache, shortness of breath, or any vision changes.  Stating in past few nights he noticed \"pressure\" in chest only when laying down, relieved by rolling over or sitting up. Questioning if he may have pulled something.     Protocol Recommended Disposition:   See in Office Today. Patient scheduled for 940 a.m. appointment. Reviewed to call back or be seen in ED with any symptoms.        Reason for Disposition   Systolic BP >= 180 OR Diastolic >= 110    Additional Information   Negative: Sounds like a life-threatening emergency to the triager   Negative: Systolic BP >= 160 OR Diastolic >= 100, and any cardiac (e.g., breathing difficulty, chest pain) or neurologic symptoms (e.g., new-onset blurred or double vision)   Negative: Patient sounds very sick or weak to the triager   Negative: Systolic BP >= 200 OR Diastolic >= 120 and having NO cardiac or neurologic symptoms   Negative: Pregnant 20 or more weeks (or postpartum < 6 weeks) with Systolic BP >= 140 OR Diastolic >= 90   Negative: Systolic BP >= 180 OR Diastolic >= 110, and missed most recent dose of blood pressure medication   Negative: Pregnant 20 or more weeks (or postpartum < 6 weeks) with new hand or face swelling   Negative: Pregnant 20 or more weeks (or postpartum < 6 weeks) and Systolic BP >= 160 OR Diastolic >= 110    Protocols used: Blood Pressure - High-A-OH    "

## 2024-02-26 ENCOUNTER — OFFICE VISIT (OUTPATIENT)
Dept: FAMILY MEDICINE | Facility: CLINIC | Age: 49
End: 2024-02-26
Payer: COMMERCIAL

## 2024-02-26 VITALS
OXYGEN SATURATION: 98 % | SYSTOLIC BLOOD PRESSURE: 138 MMHG | BODY MASS INDEX: 29.9 KG/M2 | HEIGHT: 71 IN | RESPIRATION RATE: 20 BRPM | TEMPERATURE: 97.4 F | HEART RATE: 71 BPM | WEIGHT: 213.6 LBS | DIASTOLIC BLOOD PRESSURE: 92 MMHG

## 2024-02-26 DIAGNOSIS — Z00.00 HEALTHCARE MAINTENANCE: Primary | ICD-10-CM

## 2024-02-26 DIAGNOSIS — I10 ESSENTIAL HYPERTENSION: ICD-10-CM

## 2024-02-26 DIAGNOSIS — E78.2 MIXED HYPERLIPIDEMIA: ICD-10-CM

## 2024-02-26 DIAGNOSIS — B36.0 TINEA VERSICOLOR: ICD-10-CM

## 2024-02-26 PROCEDURE — 99214 OFFICE O/P EST MOD 30 MIN: CPT | Mod: 25 | Performed by: FAMILY MEDICINE

## 2024-02-26 PROCEDURE — 99396 PREV VISIT EST AGE 40-64: CPT | Performed by: FAMILY MEDICINE

## 2024-02-26 RX ORDER — ATORVASTATIN CALCIUM 40 MG/1
40 TABLET, FILM COATED ORAL DAILY
Qty: 90 TABLET | Refills: 3 | Status: SHIPPED | OUTPATIENT
Start: 2024-02-26

## 2024-02-26 SDOH — HEALTH STABILITY: PHYSICAL HEALTH: ON AVERAGE, HOW MANY MINUTES DO YOU ENGAGE IN EXERCISE AT THIS LEVEL?: 20 MIN

## 2024-02-26 SDOH — HEALTH STABILITY: PHYSICAL HEALTH: ON AVERAGE, HOW MANY DAYS PER WEEK DO YOU ENGAGE IN MODERATE TO STRENUOUS EXERCISE (LIKE A BRISK WALK)?: 3 DAYS

## 2024-02-26 ASSESSMENT — SOCIAL DETERMINANTS OF HEALTH (SDOH): HOW OFTEN DO YOU GET TOGETHER WITH FRIENDS OR RELATIVES?: ONCE A WEEK

## 2024-02-26 NOTE — PROGRESS NOTES
"Preventive Care Visit  [facility]  Teja Gonzalez MD, Family Medicine  Feb 26, 2024    Assessment & Plan     Healthcare maintenance  We have discussed health maintenance, routine follow-up, immunizations  He declines influenza and COVID vaccines today.  He has had a referral for colon cancer screening.    Essential hypertension  Controlled with home numbers averaging around 120/60.  Continue with current dose of lisinopril    Tinea versicolor  Improving    Mixed hyperlipidemia  LDL has risen over the last couple of years.  We have discussed appropriate diet weight maintenance.  Information has been given on DASH diet.  Add atorvastatin 40 mg daily and recheck lipids in about 6 months.  - atorvastatin (LIPITOR) 40 MG tablet; Take 1 tablet (40 mg) by mouth daily  - Lipid panel reflex to direct LDL Fasting; Future    Patient has been advised of split billing requirements and indicates understanding: Yes          BMI  Estimated body mass index is 30.22 kg/m  as calculated from the following:    Height as of this encounter: 1.791 m (5' 10.5\").    Weight as of this encounter: 96.9 kg (213 lb 9.6 oz).       Counseling  Appropriate preventive services were discussed with this patient, including applicable screening as appropriate for fall prevention, nutrition, physical activity, Tobacco-use cessation, weight loss and cognition.  Checklist reviewing preventive services available has been given to the patient.  Reviewed patient's diet, addressing concerns and/or questions.   He is at risk for lack of exercise and has been provided with information to increase physical activity for the benefit of his well-being.   The patient reports drinking more than one alcoholic drink per day and sometimes engages in binge or excessive drinking. The patient was counseled and given information about possible harmful effects of excessive alcohol intake as well as where to get help for alcohol problems. "     FUTURE LABS:       - Schedule fasting labs in 6 months  Work on weight loss  Regular exercise  See Patient Instructions    Subjective   Jonn is a 48 year old, presenting for the following:  Physical (Annual px, discuss lab results done last week.)        2/26/2024     7:21 AM   Additional Questions   Roomed by Nikki SCHREIBER CMA   Accompanied by Self        Health Care Directive  Patient does not have a Health Care Directive or Living Will: Discussed advance care planning with patient; however, patient declined at this time.    HPI  Patient is here for health maintenance and follow-up on his hypertension and recent laboratory evaluation.  Cholesterol numbers were elevated.  On chart review his LDL has been increasing over the last 5 years.  He attributes this to poor diet and increased alcohol use.  Blood pressure has been controlled with 20 mg of lisinopril.  He denies side effects of medication.            2/26/2024   General Health   How would you rate your overall physical health? Good   Feel stress (tense, anxious, or unable to sleep) To some extent   (!) STRESS CONCERN      2/26/2024   Nutrition   Three or more servings of calcium each day? (!) NO   Diet: Regular (no restrictions)   How many servings of fruit and vegetables per day? (!) 0-1   How many sweetened beverages each day? 0-1         2/26/2024   Exercise   Days per week of moderate/strenous exercise 3 days   Average minutes spent exercising at this level 20 min         2/26/2024   Social Factors   Frequency of gathering with friends or relatives Once a week   Worry food won't last until get money to buy more No   Food not last or not have enough money for food? No   Do you have housing?  Yes   Are you worried about losing your housing? No   Lack of transportation? No   Unable to get utilities (heat,electricity)? No         2/26/2024   Dental   Dentist two times every year? Yes         2/26/2024   TB Screening   Were you born outside of [place]?  No          Today's PHQ-2 Score:       2/26/2024     7:18 AM   PHQ-2 ( 1999 [employer])   Q1: Little interest or pleasure in doing things 0   Q2: Feeling down, depressed or hopeless 0   PHQ-2 Score 0   Q1: Little interest or pleasure in doing things Not at all   Q2: Feeling down, depressed or hopeless Not at all   PHQ-2 Score 0           2/26/2024   Substance Use   Alcohol more than 3/day or more than 7/wk Yes   How often do you have a drink containing alcohol 2 to 3 times a week   How many alcohol drinks on typical day 5 or 6   How often do you have 5+ drinks at one occasion Weekly   Audit 2/3 Score 5   How often not able to stop drinking once started Less than monthly   How often failed to do what normally expected Never   How often needed first drink in am after a heavy drinking session Never   How often feeling of guilt or remorse after drinking Less than monthly   How often unable to remember what happened the night before Never   Have you or someone else been injured because of your drinking No   Has anyone been concerned or suggested you cut down on drinking No   TOTAL SCORE - AUDIT 10   Do you use any other substances recreationally? (!) ALCOHOL     Social History     Tobacco Use    Smoking status: Never     Passive exposure: Never    Smokeless tobacco: Never   Vaping Use    Vaping Use: Never used           2/26/2024   STI Screening   New sexual partner(s) since last STI/HIV test? No   ASCVD Risk   The ASCVD Risk score (Alexandra DK, et al., 2019) failed to calculate for the following reasons:    The valid total cholesterol range is 130 to 320 mg/dL        2/26/2024   Contraception/Family Planning   Questions about contraception or family planning No        Reviewed and updated as needed this visit by Provider                    No past medical history on file.  Past Surgical History:   Procedure Laterality Date    POLYSOMNOGRAPHY  10/10/2017    AHI:  0.3/hr; negative for ALEXA         Review of  "Systems  Constitutional, HEENT, cardiovascular, pulmonary, GI, , musculoskeletal, neuro, skin, endocrine and psych systems are negative, except as otherwise noted.     Objective    Exam  BP (!) 145/91 (BP Location: Right arm, Patient Position: Sitting, Cuff Size: Adult Large)   Pulse 71   Temp 97.4  F (36.3  C) (Tympanic)   Resp 20   Ht 1.791 m (5' 10.5\")   Wt 96.9 kg (213 lb 9.6 oz)   SpO2 98%   BMI 30.22 kg/m     Estimated body mass index is 30.22 kg/m  as calculated from the following:    Height as of this encounter: 1.791 m (5' 10.5\").    Weight as of this encounter: 96.9 kg (213 lb 9.6 oz).    Physical Exam  GENERAL: alert and no distress  EYES: Eyes grossly normal to inspection, PERRL and conjunctivae and sclerae normal  HENT: ear canals and TM's normal, nose and mouth without ulcers or lesions  NECK: no adenopathy, no asymmetry, masses, or scars  RESP: lungs clear to auscultation - no rales, rhonchi or wheezes  CV: regular rate and rhythm, normal S1 S2, no S3 or S4, no murmur, click or rub, no peripheral edema  ABDOMEN: soft, nontender, no hepatosplenomegaly, no masses and bowel sounds normal  MS: no gross musculoskeletal defects noted, no edema  SKIN: no suspicious lesions or rashes, pigment changes consistent with tinea versicolor  NEURO: Normal strength and tone, mentation intact and speech normal  PSYCH: mentation appears normal, affect normal/bright      Signed Electronically by: Teja Gonzalez MD    "

## 2024-09-24 ENCOUNTER — OFFICE VISIT (OUTPATIENT)
Dept: FAMILY MEDICINE | Facility: CLINIC | Age: 49
End: 2024-09-24
Payer: COMMERCIAL

## 2024-09-24 VITALS
SYSTOLIC BLOOD PRESSURE: 135 MMHG | HEIGHT: 71 IN | BODY MASS INDEX: 28.92 KG/M2 | RESPIRATION RATE: 16 BRPM | DIASTOLIC BLOOD PRESSURE: 100 MMHG | OXYGEN SATURATION: 96 % | HEART RATE: 91 BPM | WEIGHT: 206.6 LBS | TEMPERATURE: 98.5 F

## 2024-09-24 DIAGNOSIS — E78.2 MIXED HYPERLIPIDEMIA: Primary | ICD-10-CM

## 2024-09-24 DIAGNOSIS — Z12.11 SCREEN FOR COLON CANCER: ICD-10-CM

## 2024-09-24 DIAGNOSIS — I10 ESSENTIAL HYPERTENSION: ICD-10-CM

## 2024-09-24 DIAGNOSIS — L81.0 HYPERPIGMENTATION OF SKIN, POSTINFLAMMATORY: ICD-10-CM

## 2024-09-24 PROCEDURE — 99214 OFFICE O/P EST MOD 30 MIN: CPT | Performed by: FAMILY MEDICINE

## 2024-09-24 RX ORDER — LISINOPRIL 20 MG/1
20 TABLET ORAL DAILY
Qty: 90 TABLET | Refills: 1 | Status: SHIPPED | OUTPATIENT
Start: 2024-09-24

## 2024-09-24 NOTE — ASSESSMENT & PLAN NOTE
Although the patient does not have any history of coronary artery disease he was put on a statin due to significantly elevated LDL over 230 which suggest a familial/genetic call origin less likely amenable by simply watching his diet and doing exercise.  He will discuss this further with his primary care provider.

## 2024-09-24 NOTE — ASSESSMENT & PLAN NOTE
The patient was referred for a colonoscopy by his primary care provider earlier this year, he indicates he plans to schedule this soon he does have a family history of colon cancer through his father.

## 2024-09-24 NOTE — PROGRESS NOTES
Assessment & Plan   Problem List Items Addressed This Visit       Essential hypertension     Patient has uncontrolled hypertension, I repeated a manual blood pressure once more at 135/100.  The patient stopped taking his lisinopril thinking that when he was first prescribed this his blood pressure was probably more related to stress.  He does have a blood pressure cuff at home but does not monitor.  I refilled his lisinopril and I recommended monitoring his blood pressure at home, I also provided additional health maintenance recommendations that may lower his blood pressure including the DASH diet, limiting alcohol, aerobic exercise etc. all summarized in the after visit summary.         Relevant Medications    lisinopril (ZESTRIL) 20 MG tablet    Mixed hyperlipidemia - Primary     Although the patient does not have any history of coronary artery disease he was put on a statin due to significantly elevated LDL over 230 which suggest a familial/genetic call origin less likely amenable by simply watching his diet and doing exercise.  He will discuss this further with his primary care provider.           Hyperpigmentation of skin, postinflammatory     The patient most likely has postinflammatory hyperpigmentation after sunburn, I recommended mineral-based sunblock care 35-50 SPF to use at all times including in cloudy or overcast days as well as appropriate use of facial moisturizing lotion to help with the repair of the damaged sunburned skin.  Time will tell if this may be more consistent with melasma.  The patient does not have concerns about cosmetic appearance but we did review available treatment through dermatology including chemical versus laser dermabrasion if this should be a future concern.  Unfortunately most patient information resources were linked to cosmetic dermatology clinics heavy in advertisement for skin products or services.  I will favor a referral to dermatology if this continues to be a  "concern.         Screen for colon cancer     The patient was referred for a colonoscopy by his primary care provider earlier this year, he indicates he plans to schedule this soon he does have a family history of colon cancer through his father.                  BMI  Estimated body mass index is 29.23 kg/m  as calculated from the following:    Height as of this encounter: 1.791 m (5' 10.5\").    Weight as of this encounter: 93.7 kg (206 lb 9.6 oz).             Subjective   Jonn is a 48 year old, presenting for the following health issues:  Derm Problem (Spots on neck x 6 weeks )        9/24/2024     2:25 PM   Additional Questions   Roomed by GLADYS Whitten     History of Present Illness       Reason for visit:  Rashes on both sides of my neck  Symptom onset:  More than a month  Symptom progression:  Staying the same  Had these symptoms before:  No   He is taking medications regularly.     Jonn is here for an evaluation of dark spots in bilateral preauricular area for the past 6 weeks.  He admits he got sunburn before this started, it is not associated with pruritus or pain.  He does not regularly use a sunblock or, no history of skin cancer.  I also noticed his blood pressure being elevated, the patient stopped taking his lisinopril when he ran out, he was not sure if he still needed this medication, he has not been checking his blood pressure lately.  He also stopped taking his atorvastatin, apparently this was prescribed due to significant elevation of LDL over 230, he says he got concerned about the possibility of side effects that a friend had, at this time he will wait for his next evaluation by his primary care provider      Review of systems:  Skin concerns as described in history of present illness not associated with pruritus or pain.        \            Objective    BP (!) 135/100 (BP Location: Left arm, Patient Position: Sitting, Cuff Size: Adult Large)   Pulse 91   Temp 98.5  F (36.9  C) (Oral)   Resp 16  " " Ht 1.791 m (5' 10.5\")   Wt 93.7 kg (206 lb 9.6 oz)   SpO2 96%   BMI 29.23 kg/m    Body mass index is 29.23 kg/m .  Physical Exam     On physical examination initial blood pressure 159 open 114, repeat blood pressure manually was 135/100.  BMI of 29.2.  Skin examination reveals hyperpigmented areas in the preauricular area bilaterally that extend into the beard area, to my impression this appears to be most likely postinflammatory hyperpigmentation after sunburn although I cannot rule out melasma.  Other parts of his skin including his face do show a chronic damage with sun exposure but no suspicious lesions were visualized.          Signed Electronically by: Jaxson Lu MD    "

## 2024-09-24 NOTE — PATIENT INSTRUCTIONS
It was a pleasure to see you today.  Below are a summary of my recommendations as discussed during your visit:  It is not clear whether you have postinflammatory hyperpigmentation of the skin after a sunburn or melasma.  I recommend initial mineral-based sunblocker such as zinc, preferably hypoallergenic.  Wear at all times including if there is no much sun exposure as UV radiation is still present and is the main cause of this.  When not outside the house I will still recommend hypoallergenic moisturizing facial lotion to promote adequate healing of the skin damage by the sun.  If cosmetic concerns, dermatology has several options including chemical dermabrasion, laser dermabrasion and other therapies.  Your blood pressure was elevated and I renewed your lisinopril, I recommend you monitor your blood pressure closely.  These are additional actions that can help you reduce your blood pressure:  -Follow the DASH diet, this is a well researched and studied diet that does not just limit salt but add foods rich in potassium calcium and magnesium, that  also help your body eliminate excess salt. Other benefits from this diet is that is cardioprotective and it helps you achieve or maintain a healthy weight.  -Be aware that just no adding salt is not enough to limit the intake as many foods particularly processed foods already have a high load of salt, if in question read the labels before buying.  -Incorporate aerobic exercise to your routine if possible  -Limit alcohol  -Be aware of other things that can increase your blood pressure which include some over the counter cold medications, energy drinks and especially frequent alcohol intake   -Finally the recent you were taking atorvastatin is because of extremely high LDL cholesterol, at this levels this suggests this is of familial/genetic all origin therefore just diet and exercise may not be enough, I recommend you discuss this with your primary care provider.    I  tried to find valuable information for you regarding melasma versus hyperpigmentation of the skin, a lot of the information comes from cosmetic dermatology clinic started trying to advertise something or signed difficult documents, here is a link that seems to be a bit more reasonable.    [email or web address]      Don't hesitate to contact us if you have any questions    Dr Eldridge (Jaxson Lu MD)

## 2024-09-24 NOTE — ASSESSMENT & PLAN NOTE
The patient most likely has postinflammatory hyperpigmentation after sunburn, I recommended mineral-based sunblock care 35-50 SPF to use at all times including in cloudy or overcast days as well as appropriate use of facial moisturizing lotion to help with the repair of the damaged sunburned skin.  Time will tell if this may be more consistent with melasma.  The patient does not have concerns about cosmetic appearance but we did review available treatment through dermatology including chemical versus laser dermabrasion if this should be a future concern.  Unfortunately most patient information resources were linked to cosmetic dermatology clinics heavy in advertisement for skin products or services.  I will favor a referral to dermatology if this continues to be a concern.

## 2024-09-24 NOTE — ASSESSMENT & PLAN NOTE
Patient has uncontrolled hypertension, I repeated a manual blood pressure once more at 135/100.  The patient stopped taking his lisinopril thinking that when he was first prescribed this his blood pressure was probably more related to stress.  He does have a blood pressure cuff at home but does not monitor.  I refilled his lisinopril and I recommended monitoring his blood pressure at home, I also provided additional health maintenance recommendations that may lower his blood pressure including the DASH diet, limiting alcohol, aerobic exercise etc. all summarized in the after visit summary.

## 2024-12-02 ENCOUNTER — HOSPITAL ENCOUNTER (OUTPATIENT)
Dept: CARDIOLOGY | Facility: CLINIC | Age: 49
Discharge: HOME | End: 2024-12-02
Attending: FAMILY MEDICINE | Admitting: FAMILY MEDICINE
Payer: COMMERCIAL

## 2024-12-02 DIAGNOSIS — R07.9 CHEST PAIN: ICD-10-CM

## 2024-12-02 LAB
CV STRESS CURRENT BP HE: NORMAL
CV STRESS CURRENT HR HE: 100
CV STRESS CURRENT HR HE: 102
CV STRESS CURRENT HR HE: 107
CV STRESS CURRENT HR HE: 111
CV STRESS CURRENT HR HE: 112
CV STRESS CURRENT HR HE: 121
CV STRESS CURRENT HR HE: 122
CV STRESS CURRENT HR HE: 126
CV STRESS CURRENT HR HE: 129
CV STRESS CURRENT HR HE: 132
CV STRESS CURRENT HR HE: 132
CV STRESS CURRENT HR HE: 137
CV STRESS CURRENT HR HE: 149
CV STRESS CURRENT HR HE: 153
CV STRESS CURRENT HR HE: 165
CV STRESS CURRENT HR HE: 168
CV STRESS CURRENT HR HE: 172
CV STRESS CURRENT HR HE: 179
CV STRESS CURRENT HR HE: 180
CV STRESS CURRENT HR HE: 89
CV STRESS CURRENT HR HE: 96
CV STRESS CURRENT HR HE: 99
CV STRESS DEVIATION TIME HE: NORMAL
CV STRESS ECHO PERCENT HR HE: NORMAL
CV STRESS EXERCISE STAGE HE: NORMAL
CV STRESS EXERCISE STAGE REACHED HE: NORMAL
CV STRESS FINAL RESTING BP HE: NORMAL
CV STRESS FINAL RESTING HR HE: 100
CV STRESS MAX HR HE: 180
CV STRESS MAX TREADMILL GRADE HE: 16
CV STRESS MAX TREADMILL SPEED HE: 4.2
CV STRESS PEAK DIA BP HE: NORMAL
CV STRESS PEAK SYS BP HE: NORMAL
CV STRESS PHASE HE: NORMAL
CV STRESS PROTOCOL HE: NORMAL
CV STRESS REASON STOPPED HE: NORMAL
CV STRESS RESTING PT POSITION HE: NORMAL
CV STRESS RESTING PT POSITION HE: NORMAL
CV STRESS ST DEVIATION AMOUNT HE: NORMAL
CV STRESS ST DEVIATION ELEVATION HE: NORMAL
CV STRESS ST EVELATION AMOUNT HE: NORMAL
CV STRESS SYMPTOMS HE: NORMAL
CV STRESS TEST TYPE HE: NORMAL
CV STRESS TOTAL STAGE TIME MIN 1 HE: NORMAL
STRESS ECHO BASELINE DIASTOLIC HE: 107
STRESS ECHO BASELINE HR: 98
STRESS ECHO BASELINE SYSTOLIC BP: 156
STRESS ECHO LAST STRESS DIASTOLIC BP: 94
STRESS ECHO LAST STRESS HR: 180
STRESS ECHO LAST STRESS SYSTOLIC BP: 200
STRESS ECHO POST ESTIMATED WORKLOAD: 11.5
STRESS ECHO POST EXERCISE DUR MIN: 9
STRESS ECHO POST EXERCISE DUR SEC: 40
STRESS ECHO TARGET HR: 147

## 2024-12-02 PROCEDURE — 93325 DOPPLER ECHO COLOR FLOW MAPG: CPT | Mod: 26 | Performed by: INTERNAL MEDICINE

## 2024-12-02 PROCEDURE — 93018 CV STRESS TEST I&R ONLY: CPT | Performed by: INTERNAL MEDICINE

## 2024-12-02 PROCEDURE — 93350 STRESS TTE ONLY: CPT | Mod: 26 | Performed by: INTERNAL MEDICINE

## 2024-12-02 PROCEDURE — C8928 TTE W OR W/O FOL W/CON,STRES: HCPCS

## 2024-12-02 PROCEDURE — 93016 CV STRESS TEST SUPVJ ONLY: CPT | Performed by: INTERNAL MEDICINE

## 2024-12-02 PROCEDURE — 255N000002 HC RX 255 OP 636: Performed by: INTERNAL MEDICINE

## 2024-12-02 PROCEDURE — 999N000208 ECHO STRESS ECHOCARDIOGRAM

## 2024-12-02 PROCEDURE — 93352 ADMIN ECG CONTRAST AGENT: CPT | Performed by: INTERNAL MEDICINE

## 2024-12-02 PROCEDURE — 93321 DOPPLER ECHO F-UP/LMTD STD: CPT | Mod: 26 | Performed by: INTERNAL MEDICINE

## 2024-12-02 RX ADMIN — PERFLUTREN 5 ML: 6.52 INJECTION, SUSPENSION INTRAVENOUS at 08:30

## 2025-01-27 ENCOUNTER — PATIENT OUTREACH (OUTPATIENT)
Dept: CARE COORDINATION | Facility: CLINIC | Age: 50
End: 2025-01-27
Payer: COMMERCIAL

## 2025-02-10 ENCOUNTER — PATIENT OUTREACH (OUTPATIENT)
Dept: CARE COORDINATION | Facility: CLINIC | Age: 50
End: 2025-02-10
Payer: COMMERCIAL

## 2025-02-19 SDOH — HEALTH STABILITY: PHYSICAL HEALTH: ON AVERAGE, HOW MANY MINUTES DO YOU ENGAGE IN EXERCISE AT THIS LEVEL?: 50 MIN

## 2025-02-19 SDOH — HEALTH STABILITY: PHYSICAL HEALTH: ON AVERAGE, HOW MANY DAYS PER WEEK DO YOU ENGAGE IN MODERATE TO STRENUOUS EXERCISE (LIKE A BRISK WALK)?: 5 DAYS

## 2025-02-19 ASSESSMENT — SOCIAL DETERMINANTS OF HEALTH (SDOH): HOW OFTEN DO YOU GET TOGETHER WITH FRIENDS OR RELATIVES?: ONCE A WEEK

## 2025-02-20 ENCOUNTER — OFFICE VISIT (OUTPATIENT)
Dept: FAMILY MEDICINE | Facility: CLINIC | Age: 50
End: 2025-02-20
Payer: COMMERCIAL

## 2025-02-20 VITALS
BODY MASS INDEX: 28.43 KG/M2 | HEIGHT: 71 IN | DIASTOLIC BLOOD PRESSURE: 76 MMHG | TEMPERATURE: 97 F | RESPIRATION RATE: 16 BRPM | HEART RATE: 78 BPM | OXYGEN SATURATION: 98 % | WEIGHT: 203.1 LBS | SYSTOLIC BLOOD PRESSURE: 123 MMHG

## 2025-02-20 DIAGNOSIS — Z00.00 HEALTHCARE MAINTENANCE: Primary | ICD-10-CM

## 2025-02-20 DIAGNOSIS — I10 ESSENTIAL HYPERTENSION: Primary | ICD-10-CM

## 2025-02-20 DIAGNOSIS — I10 ESSENTIAL HYPERTENSION: ICD-10-CM

## 2025-02-20 DIAGNOSIS — Z12.11 SCREEN FOR COLON CANCER: ICD-10-CM

## 2025-02-20 DIAGNOSIS — E78.2 MIXED HYPERLIPIDEMIA: ICD-10-CM

## 2025-02-20 LAB
ANION GAP SERPL CALCULATED.3IONS-SCNC: 10 MMOL/L (ref 7–15)
BUN SERPL-MCNC: 19.6 MG/DL (ref 6–20)
CALCIUM SERPL-MCNC: 9.5 MG/DL (ref 8.8–10.4)
CHLORIDE SERPL-SCNC: 103 MMOL/L (ref 98–107)
CHOLEST SERPL-MCNC: 276 MG/DL
CREAT SERPL-MCNC: 1.47 MG/DL (ref 0.67–1.17)
EGFRCR SERPLBLD CKD-EPI 2021: 58 ML/MIN/1.73M2
FASTING STATUS PATIENT QL REPORTED: ABNORMAL
FASTING STATUS PATIENT QL REPORTED: ABNORMAL
GLUCOSE SERPL-MCNC: 97 MG/DL (ref 70–99)
HCO3 SERPL-SCNC: 25 MMOL/L (ref 22–29)
HDLC SERPL-MCNC: 53 MG/DL
LDLC SERPL CALC-MCNC: 203 MG/DL
NONHDLC SERPL-MCNC: 223 MG/DL
POTASSIUM SERPL-SCNC: 4.7 MMOL/L (ref 3.4–5.3)
SODIUM SERPL-SCNC: 138 MMOL/L (ref 135–145)
TRIGL SERPL-MCNC: 102 MG/DL

## 2025-02-20 RX ORDER — LISINOPRIL 20 MG/1
20 TABLET ORAL DAILY
Qty: 90 TABLET | Refills: 1 | Status: SHIPPED | OUTPATIENT
Start: 2025-02-20

## 2025-02-20 NOTE — PROGRESS NOTES
"Preventive Care Visit  [facility]  Teja Gonzalez MD, Family Medicine  Feb 20, 2025      Assessment & Plan     Healthcare maintenance  We have discussed health maintenance, routine follow-up, immunizations, heart healthy diet, regular activity, weight maintenance.     Essential hypertension  Controlled, continue current medication  - BASIC METABOLIC PANEL; Future  - lisinopril (ZESTRIL) 20 MG tablet; Take 1 tablet (20 mg) by mouth daily.    Screen for colon cancer  Options discussed, colonoscopy ordered he has a family history of colon cancer.  - Colonoscopy Screening  Referral; Future    Mixed hyperlipidemia  Patient has significant elevated lipids.  He recently had a normal stress test.  We have discussed the usefulness of CT coronary calcium scoring.  This may be helpful for restratification.  - CT Coronary Calcium Scan; Future  - Lipid panel reflex to direct LDL Fasting; Future      The longitudinal plan of care for the diagnosis(es)/condition(s) as documented were addressed during this visit. Due to the added complexity in care, I will continue to support Jonn in the subsequent management and with ongoing continuity of care.    Patient has been advised of split billing requirements and indicates understanding: Yes        BMI  Estimated body mass index is 28.73 kg/m  as calculated from the following:    Height as of this encounter: 1.791 m (5' 10.5\").    Weight as of this encounter: 92.1 kg (203 lb 1.6 oz).       Counseling  Appropriate preventive services were addressed with this patient via screening, questionnaire, or discussion as appropriate for fall prevention, nutrition, physical activity, Tobacco-use cessation, social engagement, weight loss and cognition.  Checklist reviewing preventive services available has been given to the patient.  Reviewed patient's diet, addressing concerns and/or questions.   He is at risk for psychosocial distress and has been provided with " information to reduce risk.   The patient reports drinking more than 3 alcoholic drinks per day and/or more than 7 drhnks per week. The patient was counseled and given information about possible harmful effects of excessive alcohol intake.        Subjective   Jonn is a 49 year old, presenting for the following:  Physical (Annual px)        2/20/2025    11:53 AM   Additional Questions   Roomed by Nikki SCHREIBER CMA   Accompanied by Self          HPI    Patient is here for health maintenance exam follow-up on chronic disease.  He has been feeling fairly well lately.  He is having trouble with chest discomfort late last year and he had a stress test which was normal.  He reports the pain is improved.  He feels it is musculoskeletal.    Visit for HTN follow up   Current medications: Lisinopril  Medication side effects: None  Medication concerns: None  Talking medications as prescribed: Yes  Blood pressure controlled: Yes  Home monitor numbers: Does not check  End organ symptoms: None        Health Care Directive  Patient does not have a Health Care Directive: Discussed advance care planning with patient; information given to patient to review.      2/19/2025   General Health   How would you rate your overall physical health? Good   Feel stress (tense, anxious, or unable to sleep) To some extent   (!) STRESS CONCERN      2/19/2025   Nutrition   Three or more servings of calcium each day? Yes   Diet: Regular (no restrictions)   How many servings of fruit and vegetables per day? (!) 2-3   How many sweetened beverages each day? (!) 2         2/19/2025   Exercise   Days per week of moderate/strenous exercise 5 days   Average minutes spent exercising at this level 50 min         2/19/2025   Social Factors   Frequency of gathering with friends or relatives Once a week   Worry food won't last until get money to buy more No   Food not last or not have enough money for food? No   Do you have housing? (Housing is defined as stable  permanent housing and does not include staying ouside in a car, in a tent, in an abandoned building, in an overnight shelter, or couch-surfing.) Yes   Are you worried about losing your housing? No   Lack of transportation? No   Unable to get utilities (heat,electricity)? No         2/19/2025   Dental   Dentist two times every year? Yes         2/26/2024   TB Screening   Were you born outside of the [place]? No         Today's PHQ-2 Score:       2/19/2025     2:05 PM   PHQ-2 ( 1999 [employer])   Q1: Little interest or pleasure in doing things 0   Q2: Feeling down, depressed or hopeless 0   PHQ-2 Score 0    Q1: Little interest or pleasure in doing things Not at all   Q2: Feeling down, depressed or hopeless Not at all   PHQ-2 Score 0       Patient-reported           2/19/2025   Substance Use   Alcohol more than 3/day or more than 7/wk Yes   How often do you have a drink containing alcohol 2 to 3 times a week   How many alcohol drinks on typical day 3 or 4   How often do you have 5+ drinks at one occasion Less than monthly   Audit 2/3 Score 2   How often not able to stop drinking once started Never   How often failed to do what normally expected Never   How often needed first drink in am after a heavy drinking session Never   How often feeling of guilt or remorse after drinking Less than monthly   How often unable to remember what happened the night before Never   Have you or someone else been injured because of your drinking No   Has anyone been concerned or suggested you cut down on drinking No   TOTAL SCORE - AUDIT 6   Do you use any other substances recreationally? No     Social History     Tobacco Use    Smoking status: Never     Passive exposure: Never    Smokeless tobacco: Never   Vaping Use    Vaping status: Never Used   Substance Use Topics    Alcohol use: Yes    Drug use: Never           2/19/2025   STI Screening   New sexual partner(s) since last STI/HIV test? No   ASCVD Risk   The ASCVD Risk score (Alexandra  "ABEBE, et al., 2019) failed to calculate for the following reasons:    The valid total cholesterol range is 130 to 320 mg/dL        2/19/2025   Contraception/Family Planning   Questions about contraception or family planning No        Reviewed and updated as needed this visit by Provider                    No past medical history on file.  Past Surgical History:   Procedure Laterality Date    POLYSOMNOGRAPHY  10/10/2017    AHI:  0.3/hr; negative for ALEXA     Lab work is in process  Labs reviewed in EPIC  BP Readings from Last 3 Encounters:   02/20/25 123/76   09/24/24 (!) 135/100   02/26/24 (!) 138/92    Wt Readings from Last 3 Encounters:   02/20/25 92.1 kg (203 lb 1.6 oz)   09/24/24 93.7 kg (206 lb 9.6 oz)   02/26/24 96.9 kg (213 lb 9.6 oz)                      Review of Systems  Constitutional, neuro, ENT, endocrine, pulmonary, cardiac, gastrointestinal, genitourinary, musculoskeletal, integument and psychiatric systems are negative, except as otherwise noted.     Objective    Exam  /76 (BP Location: Right arm, Patient Position: Sitting, Cuff Size: Adult Large)   Pulse 78   Temp 97  F (36.1  C) (Tympanic)   Resp 16   Ht 1.791 m (5' 10.5\")   Wt 92.1 kg (203 lb 1.6 oz)   SpO2 98%   BMI 28.73 kg/m     Estimated body mass index is 28.73 kg/m  as calculated from the following:    Height as of this encounter: 1.791 m (5' 10.5\").    Weight as of this encounter: 92.1 kg (203 lb 1.6 oz).    Physical Exam  GENERAL: alert and no distress  EYES: Eyes grossly normal to inspection, PERRL and conjunctivae and sclerae normal  HENT: ear canals and TM's normal, nose and mouth without ulcers or lesions  NECK: no adenopathy, no asymmetry, masses, or scars  RESP: lungs clear to auscultation - no rales, rhonchi or wheezes  CV: regular rate and rhythm, normal S1 S2, no S3 or S4, no murmur, click or rub, no peripheral edema  ABDOMEN: soft, nontender, no hepatosplenomegaly, no masses and bowel sounds normal  MS: no gross " musculoskeletal defects noted, no edema  SKIN: no suspicious lesions or rashes  NEURO: Normal strength and tone, mentation intact and speech normal  PSYCH: mentation appears normal, affect normal/bright        Signed Electronically by: Teja Gonzalez MD

## 2025-03-03 ENCOUNTER — HOSPITAL ENCOUNTER (OUTPATIENT)
Dept: CT IMAGING | Facility: CLINIC | Age: 50
Discharge: HOME | End: 2025-03-03
Attending: FAMILY MEDICINE | Admitting: FAMILY MEDICINE
Payer: COMMERCIAL

## 2025-03-03 DIAGNOSIS — E78.2 MIXED HYPERLIPIDEMIA: ICD-10-CM

## 2025-03-03 PROCEDURE — 75571 CT HRT W/O DYE W/CA TEST: CPT

## 2025-03-03 PROCEDURE — 75571 CT HRT W/O DYE W/CA TEST: CPT | Mod: 26 | Performed by: GENERAL ACUTE CARE HOSPITAL

## 2025-03-04 LAB
CV CALCIUM SCORE AGATSTON LM: 0
CV CALCIUM SCORING AGATSON LAD: 63
CV CALCIUM SCORING AGATSTON CX: 0
CV CALCIUM SCORING AGATSTON RCA: 34
CV CALCIUM SCORING AGATSTON TOTAL: 97

## 2025-03-06 ENCOUNTER — LAB (OUTPATIENT)
Dept: LAB | Facility: CLINIC | Age: 50
End: 2025-03-06
Payer: COMMERCIAL

## 2025-03-06 DIAGNOSIS — I10 ESSENTIAL HYPERTENSION: ICD-10-CM

## 2025-03-06 LAB
CREAT SERPL-MCNC: 1.4 MG/DL (ref 0.67–1.17)
EGFRCR SERPLBLD CKD-EPI 2021: 62 ML/MIN/1.73M2